# Patient Record
Sex: FEMALE | Race: WHITE | NOT HISPANIC OR LATINO | URBAN - METROPOLITAN AREA
[De-identification: names, ages, dates, MRNs, and addresses within clinical notes are randomized per-mention and may not be internally consistent; named-entity substitution may affect disease eponyms.]

---

## 2017-02-06 ENCOUNTER — EMERGENCY (EMERGENCY)
Facility: HOSPITAL | Age: 82
LOS: 1 days | End: 2017-02-06
Payer: MEDICARE

## 2017-02-06 PROCEDURE — 70450 CT HEAD/BRAIN W/O DYE: CPT | Mod: 26

## 2017-02-06 PROCEDURE — 99284 EMERGENCY DEPT VISIT MOD MDM: CPT

## 2017-10-29 PROBLEM — Z00.00 ENCOUNTER FOR PREVENTIVE HEALTH EXAMINATION: Status: ACTIVE | Noted: 2017-10-29

## 2017-11-28 ENCOUNTER — APPOINTMENT (OUTPATIENT)
Dept: UROGYNECOLOGY | Facility: CLINIC | Age: 82
End: 2017-11-28

## 2021-03-10 ENCOUNTER — OUTPATIENT (OUTPATIENT)
Dept: OUTPATIENT SERVICES | Facility: HOSPITAL | Age: 86
LOS: 1 days | End: 2021-03-10

## 2021-08-10 ENCOUNTER — TRANSCRIPTION ENCOUNTER (OUTPATIENT)
Age: 86
End: 2021-08-10

## 2021-08-10 ENCOUNTER — EMERGENCY (EMERGENCY)
Facility: HOSPITAL | Age: 86
LOS: 1 days | End: 2021-08-10
Admitting: EMERGENCY MEDICINE
Payer: MEDICARE

## 2021-08-10 ENCOUNTER — INPATIENT (INPATIENT)
Facility: HOSPITAL | Age: 86
LOS: 1 days | Discharge: HOME | DRG: 85 | End: 2021-08-12
Attending: HOSPITALIST | Admitting: SURGERY
Payer: MEDICARE

## 2021-08-10 VITALS
RESPIRATION RATE: 22 BRPM | HEART RATE: 77 BPM | SYSTOLIC BLOOD PRESSURE: 173 MMHG | TEMPERATURE: 98 F | DIASTOLIC BLOOD PRESSURE: 91 MMHG | OXYGEN SATURATION: 96 %

## 2021-08-10 DIAGNOSIS — Z90.49 ACQUIRED ABSENCE OF OTHER SPECIFIED PARTS OF DIGESTIVE TRACT: Chronic | ICD-10-CM

## 2021-08-10 DIAGNOSIS — Z90.5 ACQUIRED ABSENCE OF KIDNEY: Chronic | ICD-10-CM

## 2021-08-10 DIAGNOSIS — S06.5X9A TRAUMATIC SUBDURAL HEMORRHAGE WITH LOSS OF CONSCIOUSNESS OF UNSPECIFIED DURATION, INITIAL ENCOUNTER: ICD-10-CM

## 2021-08-10 LAB
ALBUMIN SERPL ELPH-MCNC: 3.8 G/DL — SIGNIFICANT CHANGE UP (ref 3.3–5.2)
ALP SERPL-CCNC: 56 U/L — SIGNIFICANT CHANGE UP (ref 40–120)
ALT FLD-CCNC: 8 U/L — SIGNIFICANT CHANGE UP
ANION GAP SERPL CALC-SCNC: 12 MMOL/L — SIGNIFICANT CHANGE UP (ref 5–17)
APPEARANCE UR: CLEAR — SIGNIFICANT CHANGE UP
APTT BLD: 32.2 SEC — SIGNIFICANT CHANGE UP (ref 27.5–35.5)
AST SERPL-CCNC: 16 U/L — SIGNIFICANT CHANGE UP
BACTERIA # UR AUTO: ABNORMAL
BASOPHILS # BLD AUTO: 0.03 K/UL — SIGNIFICANT CHANGE UP (ref 0–0.2)
BASOPHILS NFR BLD AUTO: 0.3 % — SIGNIFICANT CHANGE UP (ref 0–2)
BILIRUB SERPL-MCNC: 0.3 MG/DL — LOW (ref 0.4–2)
BILIRUB UR-MCNC: NEGATIVE — SIGNIFICANT CHANGE UP
BUN SERPL-MCNC: 12.9 MG/DL — SIGNIFICANT CHANGE UP (ref 8–20)
CALCIUM SERPL-MCNC: 9.1 MG/DL — SIGNIFICANT CHANGE UP (ref 8.6–10.2)
CHLORIDE SERPL-SCNC: 107 MMOL/L — SIGNIFICANT CHANGE UP (ref 98–107)
CO2 SERPL-SCNC: 22 MMOL/L — SIGNIFICANT CHANGE UP (ref 22–29)
COLOR SPEC: YELLOW — SIGNIFICANT CHANGE UP
CREAT SERPL-MCNC: 0.93 MG/DL — SIGNIFICANT CHANGE UP (ref 0.5–1.3)
DIFF PNL FLD: ABNORMAL
EOSINOPHIL # BLD AUTO: 0.04 K/UL — SIGNIFICANT CHANGE UP (ref 0–0.5)
EOSINOPHIL NFR BLD AUTO: 0.4 % — SIGNIFICANT CHANGE UP (ref 0–6)
EPI CELLS # UR: SIGNIFICANT CHANGE UP
GLUCOSE SERPL-MCNC: 135 MG/DL — HIGH (ref 70–99)
GLUCOSE UR QL: NEGATIVE MG/DL — SIGNIFICANT CHANGE UP
HCT VFR BLD CALC: 41.7 % — SIGNIFICANT CHANGE UP (ref 34.5–45)
HGB BLD-MCNC: 13 G/DL — SIGNIFICANT CHANGE UP (ref 11.5–15.5)
IMM GRANULOCYTES NFR BLD AUTO: 0.2 % — SIGNIFICANT CHANGE UP (ref 0–1.5)
KETONES UR-MCNC: NEGATIVE — SIGNIFICANT CHANGE UP
LEUKOCYTE ESTERASE UR-ACNC: NEGATIVE — SIGNIFICANT CHANGE UP
LYMPHOCYTES # BLD AUTO: 1.1 K/UL — SIGNIFICANT CHANGE UP (ref 1–3.3)
LYMPHOCYTES # BLD AUTO: 11.2 % — LOW (ref 13–44)
MCHC RBC-ENTMCNC: 28.9 PG — SIGNIFICANT CHANGE UP (ref 27–34)
MCHC RBC-ENTMCNC: 31.2 GM/DL — LOW (ref 32–36)
MCV RBC AUTO: 92.7 FL — SIGNIFICANT CHANGE UP (ref 80–100)
MONOCYTES # BLD AUTO: 0.46 K/UL — SIGNIFICANT CHANGE UP (ref 0–0.9)
MONOCYTES NFR BLD AUTO: 4.7 % — SIGNIFICANT CHANGE UP (ref 2–14)
NEUTROPHILS # BLD AUTO: 8.21 K/UL — HIGH (ref 1.8–7.4)
NEUTROPHILS NFR BLD AUTO: 83.2 % — HIGH (ref 43–77)
NITRITE UR-MCNC: NEGATIVE — SIGNIFICANT CHANGE UP
PH UR: 7 — SIGNIFICANT CHANGE UP (ref 5–8)
PLATELET # BLD AUTO: 194 K/UL — SIGNIFICANT CHANGE UP (ref 150–400)
POTASSIUM SERPL-MCNC: 4.2 MMOL/L — SIGNIFICANT CHANGE UP (ref 3.5–5.3)
POTASSIUM SERPL-SCNC: 4.2 MMOL/L — SIGNIFICANT CHANGE UP (ref 3.5–5.3)
PROT SERPL-MCNC: 6.6 G/DL — SIGNIFICANT CHANGE UP (ref 6.6–8.7)
PROT UR-MCNC: NEGATIVE MG/DL — SIGNIFICANT CHANGE UP
RBC # BLD: 4.5 M/UL — SIGNIFICANT CHANGE UP (ref 3.8–5.2)
RBC # FLD: 13.6 % — SIGNIFICANT CHANGE UP (ref 10.3–14.5)
RBC CASTS # UR COMP ASSIST: SIGNIFICANT CHANGE UP /HPF (ref 0–4)
SODIUM SERPL-SCNC: 141 MMOL/L — SIGNIFICANT CHANGE UP (ref 135–145)
SP GR SPEC: 1 — LOW (ref 1.01–1.02)
UROBILINOGEN FLD QL: NEGATIVE MG/DL — SIGNIFICANT CHANGE UP
WBC # BLD: 9.86 K/UL — SIGNIFICANT CHANGE UP (ref 3.8–10.5)
WBC # FLD AUTO: 9.86 K/UL — SIGNIFICANT CHANGE UP (ref 3.8–10.5)
WBC UR QL: SIGNIFICANT CHANGE UP

## 2021-08-10 PROCEDURE — 72170 X-RAY EXAM OF PELVIS: CPT | Mod: 26

## 2021-08-10 PROCEDURE — 99291 CRITICAL CARE FIRST HOUR: CPT | Mod: GC,25

## 2021-08-10 PROCEDURE — 74176 CT ABD & PELVIS W/O CONTRAST: CPT | Mod: 26

## 2021-08-10 PROCEDURE — 99291 CRITICAL CARE FIRST HOUR: CPT

## 2021-08-10 PROCEDURE — 73110 X-RAY EXAM OF WRIST: CPT | Mod: 26,RT

## 2021-08-10 PROCEDURE — 70486 CT MAXILLOFACIAL W/O DYE: CPT | Mod: 26

## 2021-08-10 PROCEDURE — 93306 TTE W/DOPPLER COMPLETE: CPT | Mod: 26

## 2021-08-10 PROCEDURE — 99285 EMERGENCY DEPT VISIT HI MDM: CPT | Mod: 25

## 2021-08-10 PROCEDURE — 29125 APPL SHORT ARM SPLINT STATIC: CPT

## 2021-08-10 PROCEDURE — 71045 X-RAY EXAM CHEST 1 VIEW: CPT | Mod: 26

## 2021-08-10 PROCEDURE — 72125 CT NECK SPINE W/O DYE: CPT | Mod: 26

## 2021-08-10 PROCEDURE — 70450 CT HEAD/BRAIN W/O DYE: CPT | Mod: 26

## 2021-08-10 PROCEDURE — 73564 X-RAY EXAM KNEE 4 OR MORE: CPT | Mod: 26,RT

## 2021-08-10 PROCEDURE — 99222 1ST HOSP IP/OBS MODERATE 55: CPT

## 2021-08-10 PROCEDURE — 93010 ELECTROCARDIOGRAM REPORT: CPT

## 2021-08-10 PROCEDURE — 71250 CT THORAX DX C-: CPT | Mod: 26

## 2021-08-10 PROCEDURE — 73130 X-RAY EXAM OF HAND: CPT | Mod: 26,RT

## 2021-08-10 RX ORDER — ONDANSETRON 8 MG/1
4 TABLET, FILM COATED ORAL EVERY 6 HOURS
Refills: 0 | Status: DISCONTINUED | OUTPATIENT
Start: 2021-08-10 | End: 2021-08-12

## 2021-08-10 RX ORDER — AMLODIPINE BESYLATE 2.5 MG/1
5 TABLET ORAL DAILY
Refills: 0 | Status: DISCONTINUED | OUTPATIENT
Start: 2021-08-10 | End: 2021-08-12

## 2021-08-10 RX ORDER — PANTOPRAZOLE SODIUM 20 MG/1
40 TABLET, DELAYED RELEASE ORAL DAILY
Refills: 0 | Status: DISCONTINUED | OUTPATIENT
Start: 2021-08-10 | End: 2021-08-12

## 2021-08-10 RX ORDER — LEVETIRACETAM 250 MG/1
500 TABLET, FILM COATED ORAL EVERY 12 HOURS
Refills: 0 | Status: DISCONTINUED | OUTPATIENT
Start: 2021-08-10 | End: 2021-08-12

## 2021-08-10 RX ORDER — SODIUM CHLORIDE 9 MG/ML
1000 INJECTION INTRAMUSCULAR; INTRAVENOUS; SUBCUTANEOUS
Refills: 0 | Status: DISCONTINUED | OUTPATIENT
Start: 2021-08-10 | End: 2021-08-10

## 2021-08-10 RX ORDER — NITROFURANTOIN MACROCRYSTAL 50 MG
100 CAPSULE ORAL
Refills: 0 | Status: DISCONTINUED | OUTPATIENT
Start: 2021-08-10 | End: 2021-08-11

## 2021-08-10 RX ORDER — HYDRALAZINE HCL 50 MG
10 TABLET ORAL
Refills: 0 | Status: DISCONTINUED | OUTPATIENT
Start: 2021-08-10 | End: 2021-08-12

## 2021-08-10 RX ORDER — ACETAMINOPHEN 500 MG
650 TABLET ORAL EVERY 6 HOURS
Refills: 0 | Status: DISCONTINUED | OUTPATIENT
Start: 2021-08-10 | End: 2021-08-12

## 2021-08-10 RX ORDER — LABETALOL HCL 100 MG
10 TABLET ORAL
Refills: 0 | Status: DISCONTINUED | OUTPATIENT
Start: 2021-08-10 | End: 2021-08-11

## 2021-08-10 RX ADMIN — Medication 10 MILLIGRAM(S): at 15:28

## 2021-08-10 RX ADMIN — Medication 650 MILLIGRAM(S): at 17:50

## 2021-08-10 RX ADMIN — LEVETIRACETAM 500 MILLIGRAM(S): 250 TABLET, FILM COATED ORAL at 17:29

## 2021-08-10 RX ADMIN — Medication 10 MILLIGRAM(S): at 17:29

## 2021-08-10 RX ADMIN — Medication 100 MILLIGRAM(S): at 18:16

## 2021-08-10 RX ADMIN — SODIUM CHLORIDE 50 MILLILITER(S): 9 INJECTION INTRAMUSCULAR; INTRAVENOUS; SUBCUTANEOUS at 17:31

## 2021-08-10 NOTE — ED PROVIDER NOTE - CLINICAL SUMMARY MEDICAL DECISION MAKING FREE TEXT BOX
Pt is an 87 y.o. F hx of HTN presenting after fall today with subdural hematoma. Head of bed elevated, trauma activation, neurosurgery contacted, labs. NSICU admission. Pt is an 87 y.o. F hx of HTN presenting after fall today with left subdural hematoma. Head of bed elevated, trauma activation, neurosurgery contacted, labs. NSICU admission. Laceration repaired at bedside by trauma surgery.

## 2021-08-10 NOTE — H&P ADULT - NSHPPHYSICALEXAM_GEN_ALL_CORE
Constitutional: Well-developed well nourished female in no acute distress  HEENT: Head is normocephalic with Left frontal skull lac. with small hematoma, maxillofacial structures stable, no blood or discharge from nares or oral cavity, no ding sign / raccoon eyes, EOMI b/l, pupils 4 mm round and reactive to light b/l, no active drainage or redness  Neck: trachea midline, supple  Respiratory: Breath sounds CTA b/l respirations are unlabored, no accessory muscle use, no conversational dyspnea  Cardiovascular: Regular rate & rhythm, +S1, S2  Chest: Left chest wall TTP, no obvious bruising, no subQ emphysema or crepitus palpated  Gastrointestinal: Abdomen soft, non-tender, non-distended, no rebound tenderness / guarding, no ecchymosis or external signs of abdominal trauma  Extremities: moving all extremities spontaneously, Right arm splinted with thumb ecchymosis.  2+ distal pulses.  Abrasions to b/l knees, abrasion to lateral aspect of Left leg  Pelvis: stable  Vascular: 2+ radial, femoral, and DP pulses b/l  Neurological: GCS: 15 (4/5/6). A&O x 3; no gross sensory / motor / coordination deficits  Musculoskeletal: 5/5 strength of upper and lower extremities b/l  Back: no C/T/LS spine tenderness to palpation, no step-offs or signs of external trauma to the back

## 2021-08-10 NOTE — ED PROVIDER NOTE - PHYSICAL EXAMINATION
General: NAD  Head:  NC, +1cm laceration on left forehead, mild ooze, dressing in place  Eyes: EOMI, PERRLA, no scleral icterus  Ears: no erythema/drainage  Nose: midline, no bleeding/drainage  Throat: MMM  Cardiac: RRR, no m/r/g, no lower extremity edema  Respiratory: CTABL, no wheezes/rales/rhonchi, equal chest wall expansions, no use of accessory muscles, no retractions  Abdomen: soft, nondistended, nontender, no rebound tenderness, no guarding, nonperitonitic  MSK/Vascular: full ROM, soft compartments, warm extremities  Neuro: Alert and oriented, motor/sensory intact  Psych: calm, cooperative, normal affect

## 2021-08-10 NOTE — CONSULT NOTE ADULT - ATTENDING COMMENTS
86 yo F with mild TBI - GCS 15, small L SDH, trace tSAH. No focal neurosigns.  S/p fall due to dizziness; denies any CP, SOB or palpitations; noted dizziness for the last week. No signs or Sx of infection. Normal PO intake.  EKG with non-specific inferior-lateral TWI. No hypotension on arrival.  Broad differential at this time.   VS, labs, imaging reviewed.    Plan:  admit to NCCU for close observation, possible need for surgical intervention if deteriorates  monitor telemetry  NSGy involved  syncope w/up incl. TTE, carotid Doppler  stability CTh  maintain -150  rest as above    Pt is critically ill and at high risk of deterioration/death due to abovementioned conditions.   Requires critical care interventions - ongoing repeated evaluations, interventions and management adjustment by the Attending and ICU team.

## 2021-08-10 NOTE — DISCHARGE NOTE NURSING/CASE MANAGEMENT/SOCIAL WORK - PATIENT PORTAL LINK FT
You can access the FollowMyHealth Patient Portal offered by [facility] by registering at the following website: [email or web address]. By joining [employer]’s FollowMyHealth portal, you will also be able to view your health information using other applications (apps) compatible with our system.

## 2021-08-10 NOTE — H&P ADULT - ASSESSMENT
88 y/o woman with Left frontal/temporal SDH, small frontal skull lac (repaired in trauma bay with 2-4.0 Ethilon, multiple abrasions, VSS.    1) transfer to Neurosurgical ICU  2) repeat CT scan ~ 5Pm tonight  3) neurochecks per neurosurg.  4) routine labs  5) cleared from Trauma surgery perspective

## 2021-08-10 NOTE — ED ADULT TRIAGE NOTE - CHIEF COMPLAINT QUOTE
Transfer from [facility] for Sub[facility], fall today. A&Ox4 upon arrival, laceration to forehead, abrasions to knees and shoulders with fracture to right thumb. medicated with keppra, tylenol, and tetanus

## 2021-08-10 NOTE — ED PROVIDER NOTE - ATTENDING CONTRIBUTION TO CARE
Documentation, interpretation of results, consultation with other physicians.    88 y/o F [place] [facility] [place] sustaining + head trauma, left forehead lac, right thumb fracture, and subdural hematoma. No blood thinners.    AP - NAD, 1 cm laceration to left forehead, minimal bleeding, RRR, lungs CTA B/L, abd soft, NT/ND, chest wall stable, pelvis stable, abrasions to bilateral knees, left lower leg, GCS 15.    No airway intervention required at this time, work up per trauma team and neurosurgery.

## 2021-08-10 NOTE — CONSULT NOTE ADULT - ASSESSMENT
Assessment:   88 yo F with PMH of HTN who presents today after dizziness causing fall down 4 stairs with hit to head, no LOC. Patient found to have L SDH measuring 6.7 mm with 2 mm midline shift. Patient's exam intact, no deficits noted.       Neuro:  - Q1 hour Neuro checks, Q1 hour Vitals  - HOB 30 degrees, Neck midline position  - Maintain normothermia, PO acetaminophen for temp>38 C or pain  - Neurosurgical imaging reviewed  - Repeat CTH at 1700 to establish stability of bleed  - Keppra 500 BID  - Monitor forehead lac   - Pain management & Sedation: tylenol PRN  - Turn and Position Q2  /  Activity ad aayush, with assistance  	  CV:  - SBP Goal 100-140  - BP regimen: hydralazine / labetalol PRN   - TTE   - Lipid panel in am     Pulm:  - Supplemental O2 PRN to maintain Spo2>92%  - Chest PT, OOB, Pulmonary Toilet    GI:  - NPO pending dysphagia   - Bowel regimen when PO access established  - Zofran PRN   	  Gu:  - Voiding  - I&O Q1 hour  - Monitor Electrolytes & Renal Function  - NS @ 50     Heme:  - Monitor H&H  - Chemical DVT prophylaxis: * Chemical DVT prophylaxis is contraindicated due to risk of bleeding  - Mechanical DVT Prophylaxis: Maintain B/L LE sequential compression devices  	  ID:  - Monitor WBC and Temperature    Endo  - Monitor BGL, maintain <180  - A1C in am   - TSH in am     Extremities:   - Ortho consulted for R thumb fx, recommended thumb spica cast   - F/U Dr. Hassan at discharge

## 2021-08-10 NOTE — CONSULT NOTE ADULT - ASSESSMENT
86 y/o F PMH HTN transferred [place] [facility] [place] & fall this morning. CTH at [facility] reveals Left frontotemporal 6.7 mm extra-axial hemorrhagic collection likely subdural with exophytic temporal component small epidural not excluded, and 5 mm left posterior frontoparietal possible tiny intraparenchymal hemorrhagic contusion and subarachnoid hemorrhage, 2 mm rightward shift.     Plan:  -D/w Dr. Almendarez  -Imaging reviewed  -Q1h neuro checks  -Repeat CTH in 6 hours (5:00 PM)  -SCDs for DVT PPX; chemical DVT PPX contraindicated d/t increased bleeding risk  -Keppra 500 mg BID x 7 days for seizure PPX   -Pain control PRN; avoid oversedation  -Cleared by trauma   -Admit to NSICU for further medical management / observation

## 2021-08-10 NOTE — ED PROVIDER NOTE - OBJECTIVE STATEMENT
Pt is an 87 y.o. F hx of HTN presenting after fall today. Pt transferred from [facility] [place] [place] after being noted to have subdural hematoma. The pt fell down four stairs, +head trauma with laceration to left forehead, abrasions to knees and shoulders, denies LOC. No other complaints. Old fractured right thumb which is splinted. Pt is an 87 y.o. F hx of HTN presenting after fall today. Pt [facility] after being noted to have left subdural hematoma. The pt fell down four stairs, +head trauma with laceration to left forehead, abrasions to knees and shoulders, denies LOC. No other complaints. Old fractured right thumb which is splinted.

## 2021-08-10 NOTE — H&P ADULT - HISTORY OF PRESENT ILLNESS
86 y/o woman transfer from [facility] [place] after fall down four concrete steps.  Imaging at [facility] revealed Left frontal/temporal SDH.  No LOC.  C-spine cleared by [facility].  Pt. not on any anticoagulation.  No gross long bone fractures noted.  Multiple abrasions including one left frontal skull laceration with small hematoma.

## 2021-08-10 NOTE — CHART NOTE - NSCHARTNOTEFT_GEN_A_CORE
Tertiary Trauma Survey (TTS)    Date of TTS: 08-10-21 @ 14:38                             Admit Date: 08-10-21 @ 13:40      Trauma Activation:      Subjective / 24 hour events: No acute events since initial injury.      Vital Signs Last 24 Hrs  T(C): 36.8 (10 Aug 2021 13:47), Max: 36.8 (10 Aug 2021 13:47)  T(F): 98.2 (10 Aug 2021 13:47), Max: 98.2 (10 Aug 2021 13:47)  HR: 77 (10 Aug 2021 13:47) (77 - 77)  BP: 173/91 (10 Aug 2021 13:47) (173/91 - 173/91)  BP(mean): --  RR: 22 (10 Aug 2021 13:47) (22 - 22)  SpO2: 96% (10 Aug 2021 13:47) (96% - 96%)    Physical Exam:    Neuro: [x ] non focal neurological exam [ ] Focal Neurological deficits noted to be:     HEENT: [ ] Normo-cephalic/atraumatic  [x ] abnormalities noted to be:  2cm Laceration over L frontal area, closed with 4-0 Ethilon     Pulm/Chest:  [ x] CTA b/l  [ ] chest wall non tender  [ ] abnormalities noted to be:    Cardiac: [x ] S1S2, sinus rhythm  [ ] abnormalities noted to be:     GI / Abdomen: [x ] Soft, non-tender, non-distended [ ] abnormalities noted to be:    Musculoskeletal / Extremities: [ x]normal active ROM  [ ]  abnormalities noted to be:    Integumentary: [x ] Skin intact [ ] Warm [ ] Dry [ ]abnormalities noted to be:    Vascular: [x ] 2+ palpable distal pulses  [ ] DAVIE:       [ ] abnormalities noted to be:    List Injuries Identified to Date:  L frontal SDH  L Frontal skin laceration     List Operative and Interventional Radiological Procedures:     Consults (Date):  [  ] Neurosurgery   [  ] Orthopedics  [  ] Plastics  [  ] Urology  [  ] PM&R  [  ] Social Work    RADIOLOGICAL FINDINGS REVIEW:  CXR:     Pelvis Films:     C-Spine Films:    T/L/S Spine Films:    Extremity Films:    Head CT:    C-Spine CT:    Neck CT:    Chest CT:    ABD/Pelvis CT:    Other: Tertiary Trauma Survey (TTS)    Date of TTS: 08-10-21 @ 14:38                             Admit Date: 08-10-21 @ 13:40      Trauma Activation:      Subjective / 24 hour events: No acute events since initial injury.      Vital Signs Last 24 Hrs  T(C): 36.8 (10 Aug 2021 13:47), Max: 36.8 (10 Aug 2021 13:47)  T(F): 98.2 (10 Aug 2021 13:47), Max: 98.2 (10 Aug 2021 13:47)  HR: 77 (10 Aug 2021 13:47) (77 - 77)  BP: 173/91 (10 Aug 2021 13:47) (173/91 - 173/91)  BP(mean): --  RR: 22 (10 Aug 2021 13:47) (22 - 22)  SpO2: 96% (10 Aug 2021 13:47) (96% - 96%)    Physical Exam:    Neuro: [x ] non focal neurological exam [ ] Focal Neurological deficits noted to be:     HEENT: [ ] Normo-cephalic/atraumatic  [x ] abnormalities noted to be:  2cm Laceration over L frontal area, closed with 4-0 Ethilon     Pulm/Chest:  [ x] CTA b/l  [ ] chest wall non tender  [ ] abnormalities noted to be:    Cardiac: [x ] S1S2, sinus rhythm  [ ] abnormalities noted to be:     GI / Abdomen: [x ] Soft, non-tender, non-distended [ ] abnormalities noted to be:    Musculoskeletal / Extremities: [ x]normal active ROM  [ ]  abnormalities noted to be:    Integumentary: [x ] Skin intact [ ] Warm [ ] Dry [ ]abnormalities noted to be:    Vascular: [x ] 2+ palpable distal pulses  [ ] DAVIE:       [ ] abnormalities noted to be:    List Injuries Identified to Date:  L frontal SDH  L Frontal skin laceration     List Operative and Interventional Radiological Procedures:     Consults (Date):  [  ] Neurosurgery   [  ] Orthopedics  [  ] Plastics  [  ] Urology  [  ] PM&R  [  ] Social Work    RADIOLOGICAL FINDINGS REVIEW:  CXR:  Heart enlargement again noted. No acute finding or change    Pelvis Films: No acute findings    C-Spine Films:    T/L/S Spine Films:    Extremity Films: Bilateral Knee XR - No acute findings   R Hand: There is a longitudinal fracture of the distal phalanx of the thumb in fairly good alignment and possible local comminution.    Head CT:  C-Spine CT:  Neck CT:  Left frontotemporal 6.7 mm extra-axial hemorrhagic collection likely subdural with exophytic temporal component small epidural not excluded, and 5 mm left posterior frontoparietal possible tiny intraparenchymal hemorrhagic contusion and subarachnoid hemorrhage, 2 mm rightward shift, if symptoms persist consider follow-up head CT or MRI if no contraindications.  Volume loss, microvascular disease, age-indeterminate lacunar infarcts, basal cisterns are patent. Absent lenses with cataract surgery, Left frontal scalp soft tissue swelling without underlying fracture.  Multilevel degenerative cervical spine, 3 mm anterolisthesis C7 on T1, degenerative change C6-7, no prevertebral soft tissue swelling, no obvious acute fracture, or dislocation.    Chest CT:   No acute traumatic injury.  2.  3-4mm sized posterior RUL subpleural/fissural (3/151, 182) densities. Correlation with comorbidities may determine need for follow-up CT in one year  3.  Multilevel thoracolumbar deformities favor chronic etiology    ABD/Pelvis CT:   No acute traumatic injury.  2.  3-4mm sized posterior RUL subpleural/fissural (3/151, 182) densities. Correlation with comorbidities may determine need for follow-up CT in one year  3.  Multilevel thoracolumbar deformities favor chronic etiology    Other:

## 2021-08-10 NOTE — H&P ADULT - ATTENDING COMMENTS
Trauma team activation.  I have seen and examined the patient on arrival.  GLF with ICH, transferred for OSH    ABCD Intact: GCS 15, LEROY, non focal  right thumb ecchymosis splinted. good cap refill.  I have reviewed images form OSH, no life threatening injuries.  A/P   tSDH   Admit to Neuro ICU  NO further trauma work up  required  OK to admit to Neuro ICU  Thumb fx, splinted, can follow as outpatiient.  repeat CT in 4-6 hrs

## 2021-08-10 NOTE — H&P ADULT - REASON FOR ADMISSION
Trauma, fall.  Imaging at [facility] [place] revealed Left frontal/temporal SDH.  No LOC

## 2021-08-10 NOTE — CONSULT NOTE ADULT - SUBJECTIVE AND OBJECTIVE BOX
Patient is a 87y old  Female who presents with a chief complaint of   HPI:  86 yo F with PMH of HTN who presents with traumatic SDH. Patient reports that she was feeling well this morning. Reports that she was walking outside through her back door at which time she felt dizzy, fell down a few steps, and hit her head on the concrete. Reports that she has been feeling intermittently dizzy over the past week, does not feel it is associated with standing from sitting, change in position, etc. Patient denies chest pain, palpitations, SOB associated with dizziness/fall. Patient was taken to [facility] [place], found to have L SDH with minimal shift. Tx to [facility] for further workup and treatment. Denies hx of fevers, weight loss, nausea, vomiting, AC/AP use. Currently has no complaints, including denies headache.       PAST MEDICAL & SURGICAL HISTORY:  HTN (hypertension)  S/P cholecystectomy  S/p nephrectomy    FAMILY HISTORY:  No pertinent family history in first degree relatives    SOCIAL HISTORY:  Tobacco Use: smoked for 15 years, quit over 40 years ago  EtOH use: rarely 1 glass of wine  Substance: denies     Allergies  codeine (Other)  penicillin (Hives)      REVIEW OF SYSTEMS  Negative except as noted in HPI  CONSTITUTIONAL: No fever, weight loss, or fatigue  EYES: No eye pain, visual disturbances, or discharge  ENMT:  No difficulty hearing, tinnitus, vertigo; No sinus or throat pain  NECK: No pain or stiffness  BREASTS: No pain, masses, or nipple discharge  RESPIRATORY: No cough, wheezing, chills or hemoptysis; No shortness of breath  CARDIOVASCULAR: No chest pain, palpitations, dizziness, or leg swelling  GASTROINTESTINAL: No abdominal or epigastric pain. No nausea, vomiting, or hematemesis; No diarrhea or constipation. No melena or hematochezia.  GENITOURINARY: No dysuria, frequency, hematuria, or incontinence  NEUROLOGICAL: No headaches, memory loss, loss of strength, numbness, or tremors  SKIN: No itching, burning, rashes, or lesions   LYMPH NODES: No enlarged glands  ENDOCRINE: No heat or cold intolerance; No hair loss  MUSCULOSKELETAL: Admits to thumb pain. No muscle or back pain  PSYCHIATRIC: No depression, anxiety, mood swings, or difficulty sleeping  HEME/LYMPH: No easy bruising, or bleeding gums  ALLERY AND IMMUNOLOGIC: No hives or eczema      Vital Signs Last 24 Hrs  T(C): 36.8 (10 Aug 2021 13:47), Max: 36.8 (10 Aug 2021 13:47)  T(F): 98.2 (10 Aug 2021 13:47), Max: 98.2 (10 Aug 2021 13:47)  HR: 77 (10 Aug 2021 13:47) (77 - 77)  BP: 173/91 (10 Aug 2021 13:47) (173/91 - 173/91)  RR: 22 (10 Aug 2021 13:47) (22 - 22)  SpO2: 96% (10 Aug 2021 13:47) (96% - 96%)      Physical Exam:  Constitutional: NAD, lying in ED stretcher  Neuro  * Mental Status:  GCS 15: Awake, alert, oriented to conversation. No aphasia or difficulty speaking. No dysarthria. Able to name objects and their function.  * Cranial Nerves: Cnii-Cnxii grossly intact. PERRL, EOMI, tongue midline, no gaze deviation  * Motor: RUE 5/5, LUE 5/5, RLE 5/5, LLE 5/5, no drift or dysmetria  * Sensory: Sensation intact to light touch  * Reflexes: not assessed   Cardiovascular:  S1, S2 no murmurs appreciated.  Regular rate and rhythm.  Eyes: See neurologic examination with detailed examination of eyes.  ENT: No JVD, Trachea Midline  Respiratory: Clear to auscultation.  Gastrointestinal: Soft, nontender, nondistended.  Genitourinary: [ ] Narvaez, [ x ] No Narvaez.   Musculoskeletal: No muscle wasting noted, (See neurologic assessment for full muscle strength assessment) No pretibial edema appreciated, no appreciable calf tenderness.  Skin: R thumb ecchymotic and swollen with known distal fracture. Bilateral knee abrasions. L forehead laceration with 2 stitches in place.  Musculoskeletal: See detailed muscle strength examination, listed under neurologic examination.  Hematologic / Lymph / Immunologic: No bleeding from IV sites or wounds, No lymphadenopathy, No Hives or allergic type skin lesions      LABS:                        13.0   9.86  )-----------( 194      ( 10 Aug 2021 14:51 )             41.7       RADIOLOGY & ADDITIONAL STUDIES:  [facility] 8/10/21:   IMPRESSION:    Left frontotemporal 6.7 mm extra-axial hemorrhagic collection likely subdural with exophytic temporal component small epidural not excluded, and 5 mm left posterior frontoparietal possible tiny intraparenchymal hemorrhagic contusion and subarachnoid hemorrhage, 2 mm rightward shift, if symptoms persist consider follow-up head CT or MRI if no contraindications.    Volume loss, microvascular disease, age-indeterminate lacunar infarcts, basal cisterns are patent. Absent lenses with cataract surgery, Left frontal scalp soft tissue swelling without underlying fracture.    Multilevel degenerative cervical spine, 3 mm anterolisthesis C7 on T1, degenerative change C6-7, no prevertebral soft tissue swelling, no obvious acute fracture, or dislocation.    Findings discussed with MAGAN Abebe, [facility] ED at immediate time of review, 8/10/2021 at 11:30 AM.    --- End of Report ---    ESTELA EDWARDS MD; Attending Radiologist  This document has been electronically signed. Aug 10 2021 12:27PM

## 2021-08-10 NOTE — H&P ADULT - NSHPLABSRESULTS_GEN_ALL_CORE
Color consistent with ethnicity/race, warm, dry intact, resilient.
13.0   9.86  )-----------( 194      ( 10 Aug 2021 14:51 )             41.7   08-10    141  |  107  |  12.9  ----------------------------<  135<H>  4.2   |  22.0  |  0.93    Ca    9.1      10 Aug 2021 14:51    TPro  6.6  /  Alb  3.8  /  TBili  0.3<L>  /  DBili  x   /  AST  16  /  ALT  8   /  AlkPhos  56  08-10    PTT - ( 10 Aug 2021 14:51 )  PTT:32.2 sec

## 2021-08-10 NOTE — ED ADULT NURSE NOTE - CHIEF COMPLAINT QUOTE
Transfer from [facility] [place] for Subdural collection, fall today. A&Ox4 upon arrival, laceration to forehead, abrasions to knees and shoulders with fracture to right thumb. medicated with keppra, tylenol, and tetanus

## 2021-08-10 NOTE — ED PROVIDER NOTE - NS ED ROS FT
Constitutional: no fever, no sweats, and no chills.  Eyes: no pain, no redness, and no visual changes.  ENMT: no ear pain and no hearing problems, no nasal congestion/drainage, no dysphagia, and no throat pain, no neck pain, no stiffness  CV: no chest pain, no edema.  Resp: no cough, no dyspnea  GI: no abdominal pain, no bloating, no constipation, no diarrhea, no nausea and no vomiting.  : no dysuria, no hematuria  MSK: no weakness, left forehead laceration, abrasions on elbows and knees  Skin: no lesions, and no rashes.  Neuro: no LOC, no sensory deficits, and no weakness.

## 2021-08-10 NOTE — CONSULT NOTE ADULT - SUBJECTIVE AND OBJECTIVE BOX
Patient is a 87y old  Female who presents with a chief complaint of trip & fall.     HPI: Patient is an 86 y/o F PMH HTN [place] [facility] [place] this [place]. Pt. states she felt dizzy this morning and then fell from standing. Denies LOC. Pt. states she called her daughter after falling who called EMS. [place] [facility] [place] Left frontotemporal 6.7 mm extra-axial hemorrhagic collection likely subdural with exophytic temporal component small epidural not excluded, and 5 mm left posterior frontoparietal possible tiny intraparenchymal hemorrhagic contusion and subarachnoid hemorrhage, 2 mm rightward shift. Upon arrival to [facility], pt. admits to mild headache and right thumb pain. Denies use of blood thinners, weakness, numbness/tingling, visual disturbance, back pain, nausea/vomiting.       PAST MEDICAL & SURGICAL HISTORY:  HTN (hypertension)    S/P cholecystectomy    S/p nephrectomy      FAMILY HISTORY:  No pertinent family history in first degree relatives.        SOCIAL HISTORY:  Tobacco Use: Denies.  EtOH use: Denies.  Substance: Denies.    Allergies:  codeine (Other)  penicillin (Hives)    Vital Signs Last 24 Hrs  T(C): 36.8 (10 Aug 2021 13:47), Max: 36.8 (10 Aug 2021 13:47)  T(F): 98.2 (10 Aug 2021 13:47), Max: 98.2 (10 Aug 2021 13:47)  HR: 77 (10 Aug 2021 13:47) (77 - 77)  BP: 173/91 (10 Aug 2021 13:47) (173/91 - 173/91)  BP(mean): --  RR: 22 (10 Aug 2021 13:47) (22 - 22)  SpO2: 96% (10 Aug 2021 13:47) (96% - 96%)      PHYSICAL EXAM:  GENERAL: NAD, well-groomed, well-developed  HEAD:  +L forehead laceration, normocephalic  EYES: Conjunctiva and sclera clear; corneal reflex intact  ENMT: No tonsillar erythema, exudates, or enlargement; moist mucous membranes, good dentition, no lesions  NECK: Supple, no JVD, normal thyroid  DARREN COMA SCORE: E-4 V-5 M-6 = 15  MENTAL STATUS: AAO x3; Awake; Opens eyes spontaneously; Appropriately conversant without aphasia; following simple commands  CRANIAL NERVES: PERRL. EOMI without nystagmus. Facial sensation intact V1-3 distribution b/l. Face symmetric w/ normal eye closure and smile, tongue midline. Hearing grossly intact. Speech clear. Head turning and shoulder shrug intact.   MOTOR: strength 5/5 b/l upper and lower extremities, RUE limited 2/2 hand pain  SENSATION: grossly intact to light touch all extremities  EXTREMITIES:  2+ peripheral pulses, no clubbing, cyanosis, or edema, B/L knee abrasions, R thumb splinted 2/2 fracture  SKIN: Warm, dry; no rashes or lesions    RADIOLOGY & ADDITIONAL STUDIES:  [place]  Impression: Left frontotemporal 6.7 mm extra-axial hemorrhagic collection likely subdural with exophytic temporal component small epidural not excluded, and 5 mm left posterior frontoparietal possible tiny intraparenchymal hemorrhagic contusion and subarachnoid hemorrhage, 2 mm rightward shift, if symptoms persist consider follow-up head CT or MRI if no contraindications.    Volume loss, microvascular disease, age-indeterminate lacunar infarcts, basal cisterns are patent. Absent lenses with cataract surgery, Left frontal scalp soft tissue swelling without underlying fracture.    Multilevel degenerative cervical spine, 3 mm anterolisthesis C7 on T1, degenerative change C6-7, no prevertebral soft tissue swelling, no obvious acute fracture, or dislocation. Patient is a 87y old  Female who presents with a chief complaint of trip & fall.     HPI: Patient is an 88 y/o F PMH HTN transferred from [facility] s/p trip & fall this morning. Pt. states she felt dizzy this morning and then fell from standing down 4 stairs. Denies LOC. Pt. states she called her daughter after falling who called EMS. CTH at [facility] reveals Left frontotemporal 6.7 mm extra-axial hemorrhagic collection likely subdural with exophytic temporal component small epidural not excluded, and 5 mm left posterior frontoparietal possible tiny intraparenchymal hemorrhagic contusion and subarachnoid hemorrhage, 2 mm rightward shift. Upon arrival to [facility], pt. admits to mild headache and right thumb pain. Denies use of blood thinners, weakness, numbness/tingling, visual disturbance, back pain, nausea/vomiting.       PAST MEDICAL & SURGICAL HISTORY:  HTN (hypertension)    S/P cholecystectomy    S/p nephrectomy      FAMILY HISTORY:  No pertinent family history in first degree relatives.        SOCIAL HISTORY:  Tobacco Use: Denies.  EtOH use: Denies.  Substance: Denies.    Allergies:  codeine (Other)  penicillin (Hives)    Vital Signs Last 24 Hrs  T(C): 36.8 (10 Aug 2021 13:47), Max: 36.8 (10 Aug 2021 13:47)  T(F): 98.2 (10 Aug 2021 13:47), Max: 98.2 (10 Aug 2021 13:47)  HR: 77 (10 Aug 2021 13:47) (77 - 77)  BP: 173/91 (10 Aug 2021 13:47) (173/91 - 173/91)  BP(mean): --  RR: 22 (10 Aug 2021 13:47) (22 - 22)  SpO2: 96% (10 Aug 2021 13:47) (96% - 96%)      PHYSICAL EXAM:  GENERAL: NAD, well-groomed, well-developed  HEAD:  +L forehead laceration, normocephalic  EYES: Conjunctiva and sclera clear; corneal reflex intact  ENMT: No tonsillar erythema, exudates, or enlargement; moist mucous membranes, good dentition, no lesions  NECK: Supple, no JVD, normal thyroid  DARREN COMA SCORE: E-4 V-5 M-6 = 15  MENTAL STATUS: AAO x3; Awake; Opens eyes spontaneously; Appropriately conversant without aphasia; following simple commands  CRANIAL NERVES: PERRL. EOMI without nystagmus. Facial sensation intact V1-3 distribution b/l. Face symmetric w/ normal eye closure and smile, tongue midline. Hearing grossly intact. Speech clear. Head turning and shoulder shrug intact.   MOTOR: strength 5/5 b/l upper and lower extremities, RUE limited 2/2 hand pain  SENSATION: grossly intact to light touch all extremities  EXTREMITIES:  2+ peripheral pulses, no clubbing, cyanosis, or edema, B/L knee abrasions, R thumb splinted 2/2 fracture  SKIN: Warm, dry; no rashes or lesions    RADIOLOGY & ADDITIONAL STUDIES:  8/10/21 CTH ([facility]):  Impression: Left frontotemporal 6.7 mm extra-axial hemorrhagic collection likely subdural with exophytic temporal component small epidural not excluded, and 5 mm left posterior frontoparietal possible tiny intraparenchymal hemorrhagic contusion and subarachnoid hemorrhage, 2 mm rightward shift, if symptoms persist consider follow-up head CT or MRI if no contraindications.    Volume loss, microvascular disease, age-indeterminate lacunar infarcts, basal cisterns are patent. Absent lenses with cataract surgery, Left frontal scalp soft tissue swelling without underlying fracture.    Multilevel degenerative cervical spine, 3 mm anterolisthesis C7 on T1, degenerative change C6-7, no prevertebral soft tissue swelling, no obvious acute fracture, or dislocation.

## 2021-08-11 DIAGNOSIS — I10 ESSENTIAL (PRIMARY) HYPERTENSION: ICD-10-CM

## 2021-08-11 DIAGNOSIS — S06.5X9A TRAUMATIC SUBDURAL HEMORRHAGE WITH LOSS OF CONSCIOUSNESS OF UNSPECIFIED DURATION, INITIAL ENCOUNTER: ICD-10-CM

## 2021-08-11 DIAGNOSIS — G47.00 INSOMNIA, UNSPECIFIED: ICD-10-CM
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
## 2021-08-11 LAB
A1C WITH ESTIMATED AVERAGE GLUCOSE RESULT: 5.3 % — SIGNIFICANT CHANGE UP (ref 4–5.6)
CHOLEST SERPL-MCNC: 193 MG/DL — SIGNIFICANT CHANGE UP
COVID-19 SPIKE DOMAIN AB INTERP: POSITIVE
COVID-19 SPIKE DOMAIN ANTIBODY RESULT: 99.3 U/ML — HIGH
ESTIMATED AVERAGE GLUCOSE: 105 MG/DL — SIGNIFICANT CHANGE UP (ref 68–114)
HDLC SERPL-MCNC: 64 MG/DL — SIGNIFICANT CHANGE UP
LIPID PNL WITH DIRECT LDL SERPL: 112 MG/DL — HIGH
NON HDL CHOLESTEROL: 129 MG/DL — SIGNIFICANT CHANGE UP
SARS-COV-2 IGG+IGM SERPL QL IA: 99.3 U/ML — HIGH
SARS-COV-2 IGG+IGM SERPL QL IA: POSITIVE
TRIGL SERPL-MCNC: 86 MG/DL — SIGNIFICANT CHANGE UP
TSH SERPL-MCNC: 4.33 UIU/ML — HIGH (ref 0.27–4.2)

## 2021-08-11 PROCEDURE — 99233 SBSQ HOSP IP/OBS HIGH 50: CPT

## 2021-08-11 PROCEDURE — 99232 SBSQ HOSP IP/OBS MODERATE 35: CPT

## 2021-08-11 PROCEDURE — 70450 CT HEAD/BRAIN W/O DYE: CPT | Mod: 26

## 2021-08-11 PROCEDURE — 93880 EXTRACRANIAL BILAT STUDY: CPT | Mod: 26

## 2021-08-11 RX ORDER — RALOXIFENE HYDROCHLORIDE 60 MG/1
1 TABLET, COATED ORAL
Qty: 0 | Refills: 0 | DISCHARGE

## 2021-08-11 RX ORDER — ATORVASTATIN CALCIUM 80 MG/1
40 TABLET, FILM COATED ORAL AT BEDTIME
Refills: 0 | Status: DISCONTINUED | OUTPATIENT
Start: 2021-08-11 | End: 2021-08-12

## 2021-08-11 RX ORDER — LANOLIN ALCOHOL/MO/W.PET/CERES
1 CREAM (GRAM) TOPICAL AT BEDTIME
Refills: 0 | Status: DISCONTINUED | OUTPATIENT
Start: 2021-08-11 | End: 2021-08-12

## 2021-08-11 RX ORDER — TRAMADOL HYDROCHLORIDE 50 MG/1
25 TABLET ORAL EVERY 6 HOURS
Refills: 0 | Status: DISCONTINUED | OUTPATIENT
Start: 2021-08-11 | End: 2021-08-12

## 2021-08-11 RX ORDER — NITROFURANTOIN MACROCRYSTAL 50 MG
50 CAPSULE ORAL
Refills: 0 | Status: DISCONTINUED | OUTPATIENT
Start: 2021-08-12 | End: 2021-08-12

## 2021-08-11 RX ORDER — TRAMADOL HYDROCHLORIDE 50 MG/1
25 TABLET ORAL EVERY 6 HOURS
Refills: 0 | Status: DISCONTINUED | OUTPATIENT
Start: 2021-08-11 | End: 2021-08-11

## 2021-08-11 RX ORDER — NITROFURANTOIN MACROCRYSTAL 50 MG
100 CAPSULE ORAL
Refills: 0 | Status: DISCONTINUED | OUTPATIENT
Start: 2021-08-11 | End: 2021-08-11

## 2021-08-11 RX ORDER — SODIUM CHLORIDE 9 MG/ML
250 INJECTION INTRAMUSCULAR; INTRAVENOUS; SUBCUTANEOUS ONCE
Refills: 0 | Status: COMPLETED | OUTPATIENT
Start: 2021-08-11 | End: 2021-08-11

## 2021-08-11 RX ADMIN — LEVETIRACETAM 500 MILLIGRAM(S): 250 TABLET, FILM COATED ORAL at 05:08

## 2021-08-11 RX ADMIN — AMLODIPINE BESYLATE 5 MILLIGRAM(S): 2.5 TABLET ORAL at 05:08

## 2021-08-11 RX ADMIN — ONDANSETRON 4 MILLIGRAM(S): 8 TABLET, FILM COATED ORAL at 17:29

## 2021-08-11 RX ADMIN — Medication 650 MILLIGRAM(S): at 04:30

## 2021-08-11 RX ADMIN — TRAMADOL HYDROCHLORIDE 25 MILLIGRAM(S): 50 TABLET ORAL at 17:14

## 2021-08-11 RX ADMIN — Medication 100 MILLIGRAM(S): at 05:07

## 2021-08-11 RX ADMIN — Medication 10 MILLIGRAM(S): at 17:50

## 2021-08-11 RX ADMIN — Medication 650 MILLIGRAM(S): at 17:11

## 2021-08-11 RX ADMIN — TRAMADOL HYDROCHLORIDE 25 MILLIGRAM(S): 50 TABLET ORAL at 09:37

## 2021-08-11 RX ADMIN — Medication 650 MILLIGRAM(S): at 03:52

## 2021-08-11 RX ADMIN — Medication 650 MILLIGRAM(S): at 09:38

## 2021-08-11 RX ADMIN — Medication 650 MILLIGRAM(S): at 21:47

## 2021-08-11 RX ADMIN — LEVETIRACETAM 500 MILLIGRAM(S): 250 TABLET, FILM COATED ORAL at 17:13

## 2021-08-11 RX ADMIN — Medication 650 MILLIGRAM(S): at 15:42

## 2021-08-11 RX ADMIN — SODIUM CHLORIDE 250 MILLILITER(S): 9 INJECTION INTRAMUSCULAR; INTRAVENOUS; SUBCUTANEOUS at 11:59

## 2021-08-11 RX ADMIN — Medication 650 MILLIGRAM(S): at 22:17

## 2021-08-11 RX ADMIN — ATORVASTATIN CALCIUM 40 MILLIGRAM(S): 80 TABLET, FILM COATED ORAL at 21:47

## 2021-08-11 RX ADMIN — TRAMADOL HYDROCHLORIDE 25 MILLIGRAM(S): 50 TABLET ORAL at 11:56

## 2021-08-11 RX ADMIN — Medication 650 MILLIGRAM(S): at 11:56

## 2021-08-11 RX ADMIN — PANTOPRAZOLE SODIUM 40 MILLIGRAM(S): 20 TABLET, DELAYED RELEASE ORAL at 11:59

## 2021-08-11 RX ADMIN — Medication 1 MILLIGRAM(S): at 21:47

## 2021-08-11 NOTE — PHARMACOTHERAPY INTERVENTION NOTE - COMMENTS
Referenced pharmacy fill records to obtain medication list. Need to follow up with daughter to confirm complete medication list.
Trauma B; [facility] transfer
Called [facility] pharmacy and spoke with patient's daughter at bedside regarding medication list. Per Bernard, patient on nitrofurantoin (Macrodantin) 50mg daily
(0) understands/communicates without difficulty

## 2021-08-11 NOTE — PROGRESS NOTE ADULT - ASSESSMENT
Patient is an 88 yo F with PMH of HTN who presents from [facility] with traumatic SDH. Patient felt dizzy and fell without LOC. Patient was taken to [facility], found to have a small SDH with midline shift.

## 2021-08-11 NOTE — PROGRESS NOTE ADULT - ASSESSMENT
88 yo F with mild TBI - GCS 15, small L SDH, trace tSAH. No focal neurosigns. Clinically and radiographically stable.   S/p fall due to dizziness; orthostatics positive.  EKG with non-specific inferior-lateral TWI. No hypotension on arrival.    Plan:  neurochecks q2hrs, VS q2hrs  cont telemetry  no plan for surgical intervention; NSGy involved  syncope w/up incl. TTE, carotid Doppler - complete  maintain -150  diet as tolerated  PT/OT/OOB  limit Keppra to 5 days post trauma  250 bolus now, reassess in pm  SCDs, no chemoppx in view of fresh SDH.  Pt is stable to be transferred to SDU.  Medicine team involvement would be appreciated.

## 2021-08-11 NOTE — PHYSICAL THERAPY INITIAL EVALUATION ADULT - ADDITIONAL COMMENTS
as per pt: resides in the private house with 4 step to enter(+) rails, (-) DME, denies falls prior to admission, family available to assist and move in as needed upon D/C home

## 2021-08-11 NOTE — PHYSICAL THERAPY INITIAL EVALUATION ADULT - ACTIVE RANGE OF MOTION EXAMINATION, REHAB EVAL
assessed during functional mobility, resistance not applied , except left hand (+) right thumb spica splint/bilateral upper extremity Active ROM was WFL (within functional limits)/bilateral  lower extremity Active ROM was WFL (within functional limits)

## 2021-08-11 NOTE — CHART NOTE - NSCHARTNOTEFT_GEN_A_CORE
Ms. Tiffany Nevarez, an 86 yo F who was diagnosed with SDH, is being downgraded from the neuro ICU to the medicine service.      HPI:   86 yo F with PMH of HTN who presents with traumatic SDH. Patient reports that she was feeling well this morning. Reports that she was walking outside through her back door at which time she felt dizzy, fell down a few steps, and hit her head on the concrete. Reports that she has been feeling intermittently dizzy over the past week, does not feel it is associated with standing from sitting, change in position, etc. Patient denies chest pain, palpitations, SOB associated with dizziness/fall. Patient was taken to [facility], found to have L SDH with minimal shift. Tx to [facility] for further workup and treatment. Denies hx of fevers, weight loss, nausea, vomiting, AC/AP use.       Hospital course:   8/10/21: Transferred for SDH. Exam intact on admission, stable overnight. Repeat CTH stable. TTE performed.  8/11/21: Exam remains stable.      Physical exam:   Neuro  * Mental Status:  GCS 15:  E(4), V(5), M(6).  Awake, alert, oriented to conversation.  * Cranial Nerves: Cnii-Cnxii grossly intact. PERRL, EOMI, tongue midline, no gaze deviation  * Motor: RUE 5/5, LUE 5/5, RLE 5/5, LLE 5/5  * Sensory: Sensation intact to light touch  * Reflexes: Not assessed  * Gait: Not assessed    Cardiovascular:  S1, S2 no murmurs appreciated.  Regular rate and rhythm.  Eyes: See neurologic examination with detailed examination of eyes.  ENT: No JVD, Trachea Midline.  Respiratory: Clear to auscultation.  Gastrointestinal: Soft, nontender, nondistended.  Genitourinary: [ ] Narvaez, [ x ] No Narvaez.   Musculoskeletal: No muscle wasting noted, (See neurologic assessment for full muscle strength assessment) No pretibial edema appreciated, no appreciable calf tenderness.  Skin:  Right hand splint, right thumb swollen and echymotic  Hematologic / Lymph / Immunologic: No bleeding from IV sites or wounds, No lymphadenopathy, No Hives or allergic type skin lesions      Plan by system:   Neuro:   - Q2 hour neuro checks   - Keppra 500 BID x7 days for seizure prophylaxis  - Tylenol / tramadol PRN for pain    CV:   - SBP goal 100-140  - Amlodipine 5   - Hydralazine / labetalol 10 PRN  - , atorvastatin 40  - TTE performed  - Carotid dopplers pending    Resp:   - RA    GI:   - DASH diet  - Zofran PRN  - Protonix 40 (omeprazole home med)    :   - Voiding    Heme:  - SCDs  - Lovenox 8/12    ID:   - Nitrofurantion 100 daily (home medication 2/2 nephrectomy)     Endo:   - A1C 5.3%  - TSH 4.33, free T4 in am     Extremities:  - F/U with Dr. Hassan as OP for thumb fracture       Attestation:   Patient was stable for downgrade and accepted by medicine service Dr. Linares on 8/11/21 at 1445. If further neuro ICU needs identified, please re-call [phone].

## 2021-08-11 NOTE — PHYSICAL THERAPY INITIAL EVALUATION ADULT - IMPAIRMENTS FOUND, PT EVAL
aerobic capacity/endurance/arousal, attention, and cognition/gait, locomotion, and balance/muscle strength/neuromotor development and sensory integration

## 2021-08-11 NOTE — PHYSICAL THERAPY INITIAL EVALUATION ADULT - DISCHARGE DISPOSITION, PT EVAL
assistance as needed, rehab disposition recommendation may be change base on patient  functional progress/home w/ home PT

## 2021-08-12 ENCOUNTER — TRANSCRIPTION ENCOUNTER (OUTPATIENT)
Age: 86
End: 2021-08-12

## 2021-08-12 ENCOUNTER — EMERGENCY (EMERGENCY)
Facility: HOSPITAL | Age: 86
LOS: 1 days | Discharge: HOME | End: 2021-08-12
Attending: EMERGENCY MEDICINE
Payer: MEDICARE

## 2021-08-12 VITALS
HEART RATE: 95 BPM | OXYGEN SATURATION: 96 % | HEIGHT: 60 IN | SYSTOLIC BLOOD PRESSURE: 140 MMHG | TEMPERATURE: 98 F | RESPIRATION RATE: 18 BRPM | WEIGHT: 139.99 LBS | DIASTOLIC BLOOD PRESSURE: 82 MMHG

## 2021-08-12 VITALS
SYSTOLIC BLOOD PRESSURE: 119 MMHG | DIASTOLIC BLOOD PRESSURE: 67 MMHG | OXYGEN SATURATION: 96 % | RESPIRATION RATE: 16 BRPM | TEMPERATURE: 98 F | HEART RATE: 91 BPM

## 2021-08-12 DIAGNOSIS — Z90.49 ACQUIRED ABSENCE OF OTHER SPECIFIED PARTS OF DIGESTIVE TRACT: Chronic | ICD-10-CM

## 2021-08-12 DIAGNOSIS — Z90.5 ACQUIRED ABSENCE OF KIDNEY: Chronic | ICD-10-CM

## 2021-08-12 PROBLEM — I10 ESSENTIAL (PRIMARY) HYPERTENSION: Chronic | Status: ACTIVE | Noted: 2021-08-10

## 2021-08-12 LAB
ANION GAP SERPL CALC-SCNC: 11 MMOL/L — SIGNIFICANT CHANGE UP (ref 5–17)
BUN SERPL-MCNC: 8.4 MG/DL — SIGNIFICANT CHANGE UP (ref 8–20)
CALCIUM SERPL-MCNC: 9.3 MG/DL — SIGNIFICANT CHANGE UP (ref 8.6–10.2)
CHLORIDE SERPL-SCNC: 102 MMOL/L — SIGNIFICANT CHANGE UP (ref 98–107)
CO2 SERPL-SCNC: 23 MMOL/L — SIGNIFICANT CHANGE UP (ref 22–29)
CREAT SERPL-MCNC: 0.73 MG/DL — SIGNIFICANT CHANGE UP (ref 0.5–1.3)
GLUCOSE SERPL-MCNC: 121 MG/DL — HIGH (ref 70–99)
HCT VFR BLD CALC: 40.3 % — SIGNIFICANT CHANGE UP (ref 34.5–45)
HGB BLD-MCNC: 13.2 G/DL — SIGNIFICANT CHANGE UP (ref 11.5–15.5)
MAGNESIUM SERPL-MCNC: 2.1 MG/DL — SIGNIFICANT CHANGE UP (ref 1.6–2.6)
MCHC RBC-ENTMCNC: 29.3 PG — SIGNIFICANT CHANGE UP (ref 27–34)
MCHC RBC-ENTMCNC: 32.8 GM/DL — SIGNIFICANT CHANGE UP (ref 32–36)
MCV RBC AUTO: 89.4 FL — SIGNIFICANT CHANGE UP (ref 80–100)
PHOSPHATE SERPL-MCNC: 3.3 MG/DL — SIGNIFICANT CHANGE UP (ref 2.4–4.7)
PLATELET # BLD AUTO: 161 K/UL — SIGNIFICANT CHANGE UP (ref 150–400)
POTASSIUM SERPL-MCNC: 4 MMOL/L — SIGNIFICANT CHANGE UP (ref 3.5–5.3)
POTASSIUM SERPL-SCNC: 4 MMOL/L — SIGNIFICANT CHANGE UP (ref 3.5–5.3)
RBC # BLD: 4.51 M/UL — SIGNIFICANT CHANGE UP (ref 3.8–5.2)
RBC # FLD: 13.9 % — SIGNIFICANT CHANGE UP (ref 10.3–14.5)
SODIUM SERPL-SCNC: 136 MMOL/L — SIGNIFICANT CHANGE UP (ref 135–145)
T4 FREE SERPL-MCNC: 1.2 NG/DL — SIGNIFICANT CHANGE UP (ref 0.9–1.8)
VIT B12 SERPL-MCNC: 1507 PG/ML — HIGH (ref 232–1245)
WBC # BLD: 8.87 K/UL — SIGNIFICANT CHANGE UP (ref 3.8–10.5)
WBC # FLD AUTO: 8.87 K/UL — SIGNIFICANT CHANGE UP (ref 3.8–10.5)

## 2021-08-12 PROCEDURE — 97166 OT EVAL MOD COMPLEX 45 MIN: CPT

## 2021-08-12 PROCEDURE — 99284 EMERGENCY DEPT VISIT MOD MDM: CPT

## 2021-08-12 PROCEDURE — 83036 HEMOGLOBIN GLYCOSYLATED A1C: CPT

## 2021-08-12 PROCEDURE — 93880 EXTRACRANIAL BILAT STUDY: CPT

## 2021-08-12 PROCEDURE — 70450 CT HEAD/BRAIN W/O DYE: CPT | Mod: MA

## 2021-08-12 PROCEDURE — 80048 BASIC METABOLIC PNL TOTAL CA: CPT

## 2021-08-12 PROCEDURE — 84439 ASSAY OF FREE THYROXINE: CPT

## 2021-08-12 PROCEDURE — 80061 LIPID PANEL: CPT

## 2021-08-12 PROCEDURE — 86769 SARS-COV-2 COVID-19 ANTIBODY: CPT

## 2021-08-12 PROCEDURE — C8929: CPT

## 2021-08-12 PROCEDURE — 85025 COMPLETE CBC W/AUTO DIFF WBC: CPT

## 2021-08-12 PROCEDURE — 86901 BLOOD TYPING SEROLOGIC RH(D): CPT

## 2021-08-12 PROCEDURE — 97163 PT EVAL HIGH COMPLEX 45 MIN: CPT

## 2021-08-12 PROCEDURE — G1004: CPT

## 2021-08-12 PROCEDURE — 85027 COMPLETE CBC AUTOMATED: CPT

## 2021-08-12 PROCEDURE — 84443 ASSAY THYROID STIM HORMONE: CPT

## 2021-08-12 PROCEDURE — 99284 EMERGENCY DEPT VISIT MOD MDM: CPT | Mod: 25

## 2021-08-12 PROCEDURE — 99285 EMERGENCY DEPT VISIT HI MDM: CPT

## 2021-08-12 PROCEDURE — 86850 RBC ANTIBODY SCREEN: CPT

## 2021-08-12 PROCEDURE — 84100 ASSAY OF PHOSPHORUS: CPT

## 2021-08-12 PROCEDURE — 99239 HOSP IP/OBS DSCHRG MGMT >30: CPT

## 2021-08-12 PROCEDURE — 81001 URINALYSIS AUTO W/SCOPE: CPT

## 2021-08-12 PROCEDURE — 86900 BLOOD TYPING SEROLOGIC ABO: CPT

## 2021-08-12 PROCEDURE — 80053 COMPREHEN METABOLIC PANEL: CPT

## 2021-08-12 PROCEDURE — 70450 CT HEAD/BRAIN W/O DYE: CPT

## 2021-08-12 PROCEDURE — 99232 SBSQ HOSP IP/OBS MODERATE 35: CPT

## 2021-08-12 PROCEDURE — 83735 ASSAY OF MAGNESIUM: CPT

## 2021-08-12 PROCEDURE — 85730 THROMBOPLASTIN TIME PARTIAL: CPT

## 2021-08-12 PROCEDURE — 36415 COLL VENOUS BLD VENIPUNCTURE: CPT

## 2021-08-12 PROCEDURE — 70450 CT HEAD/BRAIN W/O DYE: CPT | Mod: 26,MA

## 2021-08-12 PROCEDURE — 82607 VITAMIN B-12: CPT

## 2021-08-12 RX ORDER — PANTOPRAZOLE SODIUM 20 MG/1
40 TABLET, DELAYED RELEASE ORAL
Refills: 0 | Status: DISCONTINUED | OUTPATIENT
Start: 2021-08-12 | End: 2021-08-12

## 2021-08-12 RX ORDER — ONDANSETRON 8 MG/1
4 TABLET, FILM COATED ORAL ONCE
Refills: 0 | Status: COMPLETED | OUTPATIENT
Start: 2021-08-12 | End: 2021-08-12

## 2021-08-12 RX ORDER — AMLODIPINE BESYLATE 2.5 MG/1
10 TABLET ORAL DAILY
Refills: 0 | Status: DISCONTINUED | OUTPATIENT
Start: 2021-08-12 | End: 2021-08-12

## 2021-08-12 RX ORDER — LEVETIRACETAM 250 MG/1
1 TABLET, FILM COATED ORAL
Qty: 10 | Refills: 0
Start: 2021-08-12 | End: 2021-08-16

## 2021-08-12 RX ORDER — AMLODIPINE BESYLATE 2.5 MG/1
1 TABLET ORAL
Qty: 0 | Refills: 0 | DISCHARGE

## 2021-08-12 RX ORDER — ATORVASTATIN CALCIUM 80 MG/1
1 TABLET, FILM COATED ORAL
Qty: 30 | Refills: 0
Start: 2021-08-12 | End: 2021-09-10

## 2021-08-12 RX ORDER — AMLODIPINE BESYLATE 2.5 MG/1
1 TABLET ORAL
Qty: 30 | Refills: 0
Start: 2021-08-12 | End: 2021-09-10

## 2021-08-12 RX ADMIN — Medication 650 MILLIGRAM(S): at 09:15

## 2021-08-12 RX ADMIN — AMLODIPINE BESYLATE 5 MILLIGRAM(S): 2.5 TABLET ORAL at 05:19

## 2021-08-12 RX ADMIN — Medication 650 MILLIGRAM(S): at 05:47

## 2021-08-12 RX ADMIN — LEVETIRACETAM 500 MILLIGRAM(S): 250 TABLET, FILM COATED ORAL at 05:19

## 2021-08-12 RX ADMIN — ONDANSETRON 4 MILLIGRAM(S): 8 TABLET, FILM COATED ORAL at 00:38

## 2021-08-12 RX ADMIN — PANTOPRAZOLE SODIUM 40 MILLIGRAM(S): 20 TABLET, DELAYED RELEASE ORAL at 11:32

## 2021-08-12 RX ADMIN — Medication 650 MILLIGRAM(S): at 05:17

## 2021-08-12 RX ADMIN — ONDANSETRON 4 MILLIGRAM(S): 8 TABLET, FILM COATED ORAL at 05:40

## 2021-08-12 RX ADMIN — Medication 650 MILLIGRAM(S): at 10:24

## 2021-08-12 RX ADMIN — Medication 10 MILLIGRAM(S): at 02:10

## 2021-08-12 NOTE — PROGRESS NOTE ADULT - SUBJECTIVE AND OBJECTIVE BOX
spoke to neurosurgery about the 3rd ct scan and it shows improvement, cleared for dc to h ome with follow up with neurosurgery

## 2021-08-12 NOTE — OCCUPATIONAL THERAPY INITIAL EVALUATION ADULT - GENERAL OBSERVATIONS, REHAB EVAL
pt received sitting in bedside chair and left as found, no c/o pain, +left thumb spika splint, +Tele. +, and primafit in place

## 2021-08-12 NOTE — OCCUPATIONAL THERAPY INITIAL EVALUATION ADULT - ADDITIONAL COMMENTS
as per pt, was independent prior, lives alone however daughter is coming to stay with pt for several weeks and pt also has supportive family in the area, no DME prior

## 2021-08-12 NOTE — OCCUPATIONAL THERAPY INITIAL EVALUATION ADULT - GROSSLY INTACT, SENSORY
pt denies any numbness or tingling in b/l UE's, except c/o thumb decreased sensation)/Grossly Intact

## 2021-08-12 NOTE — OCCUPATIONAL THERAPY INITIAL EVALUATION ADULT - RANGE OF MOTION EXAMINATION, UPPER EXTREMITY
except left hand in spika splint and wrist immobilized and able to move all digits except thum/bilateral UE Active ROM was WNL (within normal limits)

## 2021-08-12 NOTE — PROGRESS NOTE ADULT - ASSESSMENT
86 yo F with PMH of HTN who presents from [facility] with traumatic SDH. Patient felt dizzy and fell without LOC. Patient was taken to [facility], found to have a small SDH with midline shift.     Plan:  -D/w attending   -Q2hr neuro checks   -Keppra 500 BID x 7 days for seizure prophylaxis   -Recommend repeat CTH in am   -SBP goal 100-140  -Amlodipine 10 mg daily   -Hydralazine/Labetalol PRN  -Atorvastatin 40mg daily   -SpO2 adequate on room air, maintain>92%  -DASH diet   -Zofran PRN  -Protonix 40 mg daily   -SCDs b/l for DVT prophylaxis   -Hold AC/AP at this time   -Encourage ambulation/OOB with assist   -PT/OT  -Further medical management/supportive care per primary team  88 yo F with PMH of HTN who presents from [facility] with traumatic SDH. Patient felt dizzy and fell without LOC. Patient was taken to [facility], found to have a small SDH with midline shift.     Plan:  -D/w attending   -Q2hr neuro checks   -Keppra 500 BID x 7 days for seizure prophylaxis   -Recommend repeat CTH in am   -SBP goal 100-140  -Amlodipine 10 mg daily   -Hydralazine/Labetalol PRN  -Atorvastatin 40mg daily   -SpO2 adequate on room air, maintain>92%  -DASH diet   -Zofran PRN  -Protonix 40 mg daily   -SCDs b/l for DVT prophylaxis   -Hold AC/AP at this time   -Encourage ambulation/OOB with assist   -PT/OT  -Further medical management/supportive care per primary team     --    NSGY Attg:    see above    patient seen and examined by PA staff    agree with exam and plan as above

## 2021-08-12 NOTE — DISCHARGE NOTE PROVIDER - PROVIDER TOKENS
PROVIDER:[TOKEN:[[id number]]],FREE:[LAST:[primary care],PHONE:[(   )    -],FAX:[(   )    -],ADDRESS:[pcp]]

## 2021-08-12 NOTE — CHART NOTE - NSCHARTNOTEFT_GEN_A_CORE
HPI:  86 y/o woman transfer from [facility] after fall down four concrete steps.  Imaging at [facility] revealed Left frontal/temporal SDH.  No LOC.  C-spine cleared by [facility].  Pt. not on any anticoagulation.  No gross long bone fractures noted.  Multiple abrasions including one left frontal skull laceration with small hematoma. (10 Aug 2021 15:21)    INTERVAL HISTORY:  Patient was discharged earlier today, but neurosurgery requested repeat CTH prior to D/C. Patient arrived at ED for the repeat CTH, brought in by daughter.     RADIOLOGY:  CT Head No Cont (08.12.21 @ 15:06):  COMPARISON: CT head 8/11/2021  FINDINGS:  There is mild diffuse parenchymal volume loss. There are areas of low attenuation in the periventricular white matter likely related to moderate chronic microvascular ischemic changes.  Small left acute left subdural hematoma without significant change in the left temporal region but slightly smaller in the left parietal region on the coronal plane measuring 6 mm in maximum width, previously measuring 7 mm.  There is no midline shift. There is no acute territorial infarct. There is no hydrocephalus.  The cranium is intact. Left frontal soft tissue swelling/hematoma. The visualized paranasal sinuses are well-aerated.  IMPRESSION:  Left subdural hematoma as described above    CT Head No Cont (08.11.21 @ 23:04):  IMPRESSION:  Left temporal and parietal convexity subdural hematoma with new component of subdural hematoma over the left parietal convexity. Maximum thickness of the subdural hematoma or the left parietal convexity 7 mm. No significant mass effect or shift of the midline.    CT Head No Cont (08.10.21 @ 16:03):  IMPRESSION:  Comparison 8/10/2021 at 11:06 AM (acc# [id number]), no change left frontotemporal 6.7 mm subdural hemorrhage, left frontal parietal 5 mm possible subarachnoid or intraparenchymal hemorrhagic contusion, 2 mm rightward shift, volume loss, microvascular disease, basal cisterns remain patent. Redemonstration left frontotemporal scalp soft tissue swelling with interval subcutaneous emphysema (3:37). If symptoms persist consider follow-up head CT or MRI if no contraindications.      88 yo F with PMH of HTN who presents from [facility] with traumatic SDH. Patient felt dizzy and fell without LOC. Patient was taken to [facility], found to have a small SDH with midline shift. Patient had repeat CTH in ED today 8/12, which was read as stable.     Plan:  -D/w attending   -Keppra 500 BID x 7 days for seizure prophylaxis   -SBP goal 100-140  -Hold AC/AP at this time   -No absolute neurosurgical contraindication to discharge at this time as patient remains neurologically intact and repeat CTH is stable   -Follow up with Dr. Mota in office within 2 weeks of discharge   -Return to ED with any new or worsening symptoms - such as worsening headache, dizziness, nausea, vomiting, weakness, paresthesias, uncontrolled movements, etc.   -Discussed with Dr. Gavin Vidales (hospitalist) HPI:  86 y/o woman [place] [facility] [place] four concrete steps.  Imaging at [facility] revealed Left frontal/temporal SDH.  No LOC.  C-spine cleared by [facility].  Pt. not on any anticoagulation.  No gross long bone fractures noted.  Multiple abrasions including one left frontal skull laceration with small hematoma. (10 Aug 2021 15:21)    INTERVAL HISTORY:  Patient was discharged earlier today, but neurosurgery requested repeat CTH prior to D/C. Patient arrived at ED for the repeat CTH, brought in by daughter.     RADIOLOGY:  CT Head No Cont (08.12.21 @ 15:06):  COMPARISON: CT head 8/11/2021  FINDINGS:  There is mild diffuse parenchymal volume loss. There are areas of low attenuation in the periventricular white matter likely related to moderate chronic microvascular ischemic changes.  Small left acute left subdural hematoma without significant change in the left temporal region but slightly smaller in the left parietal region on the coronal plane measuring 6 mm in maximum width, previously measuring 7 mm.  There is no midline shift. There is no acute territorial infarct. There is no hydrocephalus.  The cranium is intact. Left frontal soft tissue swelling/hematoma. The visualized paranasal sinuses are well-aerated.  IMPRESSION:  Left subdural hematoma as described above    CT Head No Cont (08.11.21 @ 23:04):  IMPRESSION:  Left temporal and parietal convexity subdural hematoma with new component of subdural hematoma over the left parietal convexity. Maximum thickness of the subdural hematoma or the left parietal convexity 7 mm. No significant mass effect or shift of the midline.    CT Head No Cont (08.10.21 @ 16:03):  IMPRESSION:  Comparison 8/10/2021 at 11:06 AM (acc# [id number]), no change left frontotemporal 6.7 mm subdural hemorrhage, left frontal parietal 5 mm possible subarachnoid or intraparenchymal hemorrhagic contusion, 2 mm rightward shift, volume loss, microvascular disease, basal cisterns remain patent. Redemonstration left frontotemporal scalp soft tissue swelling with interval subcutaneous emphysema (3:37). If symptoms persist consider follow-up head CT or MRI if no contraindications.      86 yo F with PMH of HTN who presents from [facility] with traumatic SDH. Patient felt dizzy and fell without LOC. Patient was [place] [facility], found to have a small SDH with midline shift. Patient had repeat CTH in ED today 8/12, which was read as stable.     Plan:  -D/w attending   -Keppra 500 BID x 7 days for seizure prophylaxis   -SBP goal 100-140  -Hold AC/AP at this time   -No absolute neurosurgical contraindication to discharge at this time as patient remains neurologically intact and repeat CTH is stable   -Follow up with Dr. Mota in office within 2 weeks of discharge   -Return to ED with any new or worsening symptoms - such as worsening headache, dizziness, nausea, vomiting, weakness, paresthesias, uncontrolled movements, etc.   -Discussed with Dr. Gavin Vidales (hospitalist)    --    NSGY Attg:    see above    patient seen and examined by PA staff    repeat CDT stable    agree with above

## 2021-08-12 NOTE — ED PROVIDER NOTE - CARE PROVIDER_API CALL
Warren Mota)  Neurosurgery  [address] [place] [address]  Phone: [phone]  Fax: [phone]  Follow Up Time:

## 2021-08-12 NOTE — PROGRESS NOTE ADULT - SUBJECTIVE AND OBJECTIVE BOX
HPI:  86 y/o woman transfer from [facility] after fall down four concrete steps.  Imaging at [facility] revealed Left frontal/temporal SDH.  No LOC.  C-spine cleared by [facility].  Pt. not on any anticoagulation.  No gross long bone fractures noted.  Multiple abrasions including one left frontal skull laceration with small hematoma. (10 Aug 2021 15:21)    INTERVAL HPI/OVERNIGHT EVENTS:  87y Female seen and examined by neurosurgery team, sitting comfortably in chair. Patient reports mild headache today. Downgraded from NSICU yesterday . No acute overnight events reported.     Vital Signs Last 24 Hrs  T(C): 36.8 (12 Aug 2021 11:34), Max: 36.8 (12 Aug 2021 00:15)  T(F): 98.2 (12 Aug 2021 11:34), Max: 98.2 (12 Aug 2021 00:15)  HR: 91 (12 Aug 2021 11:34) (77 - 103)  BP: 119/67 (12 Aug 2021 11:34) (105/64 - 171/91)  BP(mean): 92 (12 Aug 2021 04:00) (75 - 113)  RR: 16 (12 Aug 2021 11:34) (12 - 33)  SpO2: 96% (12 Aug 2021 11:34) (93% - 99%)    PHYSICAL EXAM:  GENERAL: NAD, calm, pleasant  HEAD: L forehead abrasion, normocephalic  DARREN COMA SCORE: E-4 V-5 M-6 = 15  MENTAL STATUS: AAO x3; Awake; Opens eyes spontaneously; Appropriately conversant without aphasial; Following commands   CRANIAL NERVES: PERRL. EOMI without nystagmus. Facial sensation intact V1-3 distribution b/l. Face symmetric w/ normal eye closure and smile, tongue midline. Hearing grossly intact. Speech clear. Head turning and shoulder shrug intact.   MOTOR: strength 5/5 b/l upper and lower extremities  SENSATION: grossly intact to light touch all extremities  CHEST/LUNG: Nonlabored breathing, no signs of respiratory distress   HEART: +S1/+S2; Regular rate and rhythm  ABDOMEN: Soft, nontender, nondistended  EXTREMITIES:  2+ peripheral pulses b/l  SKIN: Warm, dry    LABS:                        13.2   8.87  )-----------( 161      ( 12 Aug 2021 05:53 )             40.3         136  |  102  |  8.4  ----------------------------<  121<H>  4.0   |  23.0  |  0.73    Ca    9.3      12 Aug 2021 05:53  Phos  3.3       Mg     2.1         TPro  6.6  /  Alb  3.8  /  TBili  0.3<L>  /  DBili  x   /  AST  16  /  ALT  8   /  AlkPhos  56  -10    PTT - ( 10 Aug 2021 14:51 )  PTT:32.2 sec  Urinalysis Basic - ( 10 Aug 2021 17:55 )    Color: Yellow / Appearance: Clear / S.005 / pH: x  Gluc: x / Ketone: Negative  / Bili: Negative / Urobili: Negative mg/dL   Blood: x / Protein: Negative mg/dL / Nitrite: Negative   Leuk Esterase: Negative / RBC: 0-2 /HPF / WBC 0-2   Sq Epi: x / Non Sq Epi: Occasional / Bacteria: Occasional         @ 07:  -   @ 07:00  --------------------------------------------------------  IN: 400 mL / OUT: 775 mL / NET: -375 mL     @ 07: @ 12:21  --------------------------------------------------------  IN: 120 mL / OUT: 0 mL / NET: 120 mL        RADIOLOGY & ADDITIONAL TESTS:  CT Head No Cont (21 @ 23:04):  IMPRESSION:  Left temporal and parietal convexity subdural hematoma with new component of subdural hematoma over the left parietal convexity. Maximum thickness of the subdural hematoma or the left parietal convexity 7 mm. No significant mass effect or shift of the midline.    CT Head No Cont (08.10.21 @ 16:03):  IMPRESSION:  Comparison 8/10/2021 at 11:06 AM (acc# [id number]), no change left frontotemporal 6.7 mm subdural hemorrhage, left frontal parietal 5 mm possible subarachnoid or intraparenchymal hemorrhagic contusion, 2 mm rightward shift, volume loss, microvascular disease, basal cisterns remain patent. Redemonstration left frontotemporal scalp soft tissue swelling with interval subcutaneous emphysema (3:37). If symptoms persist consider follow-up head CT or MRI if no contraindications.    
88 yo F with PMH of HTN who presents with traumatic SDH. Patient reports that she was feeling well this morning. Reports that she was walking outside through her back door at which time she felt dizzy, fell down a few steps, and hit her head on the concrete. Reports that she has been feeling intermittently dizzy over the past week, does not feel it is associated with standing from sitting, change in position, etc. Patient denies chest pain, palpitations, SOB associated with dizziness/fall. Patient was [place] [facility] found to have L SDH with minimal shift. Tx to [facility] for further workup and treatment. Denies hx of fevers, weight loss, nausea, vomiting, AC/AP use. Currently has no complaints, including denies headache. (8/10/2021)  Of note, pt's daughter reveals that pt is suffering from depression after she lost her  approx a year ago, that she has poor PO intake.  Admitted to NCCU.    No o/n events reported.  Orthostatics checked this am - positive.   VS, labs, imaging reviewed.  ROS: episodes of dizziness and generalized weakness.    Exam:  NAD, cooperative.  L forehead laceration with 2 stiches.  AOx3, speech fluent, comprehension intact, PER, EOMI, no visual deficit, motor - 5/5 x4, sensation intact to LT.  CTAB  S1S2 present  Abd soft, NT  No peripheral swelling   R thumb in cast.  
HPI:  88 y/o woman transfer from [facility] after fall down four concrete steps.  Imaging at [facility] revealed Left frontal/temporal SDH.  No LOC.  C-spine cleared by [facility].  Pt. not on any anticoagulation.  No gross long bone fractures noted.  Multiple abrasions including one left frontal skull laceration with small hematoma. (10 Aug 2021 15:21)      Interval History:  24 Hour Events: No acute events.    1. Patient's Neurologic Exam unchanged.    2. Patient's Hemodynamic's maintained without drips.    3. Patient's Respiratory status is: adequate    4. Patient is pending: Patient is pending further neurologic recovery, further neurologic, respiratory, and hemodynamic monitoring & management in the ICU.     5. Dispo: ICU for now, downgrade when clinically stable.         Physical Exam:  Constitutional: NAD    Neuro  * Mental Status:  GCS 15:  E(4), V(5), M(6).  Awake, alert, oriented to conversation.  * Cranial Nerves: Cnii-Cnxii grossly intact. PERRL, EOMI, tongue midline, no gaze deviation  * Motor: RUE 5/5, LUE 5/5, RLE 5/5, LLE 5/5  * Sensory: Sensation intact to light touch  * Reflexes: Not assessed  * Gait: Not assessed    Cardiovascular:  S1, S2 no murmurs appreciated.  Regular rate and rhythm.  Eyes: See neurologic examination with detailed examination of eyes.  ENT: No JVD, Trachea Midline.  Respiratory: Clear to auscultation.  Gastrointestinal: Soft, nontender, nondistended.  Genitourinary: [ ] Narvaez, [ x ] No Narvaez.   Musculoskeletal: No muscle wasting noted, (See neuorlogic assessment for full muscle strength assessment) No pretibial edema appreciated, no appreciable calf tenderness.  Skin:  Right hand splint, right thumb swollen and echymotic  Hematologic / Lymph / Immunologic: No bleeding from IV sites or wounds, No lymphadenopathy, No Hives or allergic type skin lesions      Vitals:  Vital Signs Last 24 Hrs  T(C): 36.6 (11 Aug 2021 04:04), Max: 36.8 (10 Aug 2021 13:47)  T(F): 97.8 (11 Aug 2021 04:04), Max: 98.2 (10 Aug 2021 13:47)  HR: 67 (11 Aug 2021 03:00) (63 - 77)  BP: 155/68 (11 Aug 2021 03:00) (120/94 - 173/91)  BP(mean): 93 (11 Aug 2021 03:00) (76 - 102)  RR: 16 (11 Aug 2021 03:00) (14 - 22)  SpO2: 95% (11 Aug 2021 03:00) (92% - 99%)    I&O:  I&O's Summary    10 Aug 2021 07:01  -  11 Aug 2021 04:26  --------------------------------------------------------  IN: 100 mL / OUT: 900 mL / NET: -800 mL        Labs & Radiology:                        13.0   9.86  )-----------( 194      ( 10 Aug 2021 14:51 )             41.7       08-10    141  |  107  |  12.9  ----------------------------<  135<H>  4.2   |  22.0  |  0.93    Ca    9.1      10 Aug 2021 14:51    TPro  6.6  /  Alb  3.8  /  TBili  0.3<L>  /  DBili  x   /  AST  16  /  ALT  8   /  AlkPhos  56  08-10      LIVER FUNCTIONS - ( 10 Aug 2021 14:51 )  Alb: 3.8 g/dL / Pro: 6.6 g/dL / ALK PHOS: 56 U/L / ALT: 8 U/L / AST: 16 U/L / GGT: x                   PTT - ( 10 Aug 2021 14:51 )  PTT:32.2 sec            CAPILLARY BLOOD GLUCOSE          Neurosurgery Imaging:      Medications:  MEDICATIONS  (STANDING):  amLODIPine   Tablet 5 milliGRAM(s) Oral daily  levETIRAcetam 500 milliGRAM(s) Oral every 12 hours  nitrofurantoin monohydrate/macrocrystals (MACROBID) 100 milliGRAM(s) Oral two times a day  pantoprazole   Suspension 40 milliGRAM(s) Oral daily    MEDICATIONS  (PRN):  acetaminophen   Tablet .. 650 milliGRAM(s) Oral every 6 hours PRN Moderate Pain (4 - 6)  hydrALAZINE Injectable 10 milliGRAM(s) IV Push every 2 hours PRN SBP >140  labetalol Injectable 10 milliGRAM(s) IV Push every 2 hours PRN SBP >140  ondansetron Injectable 4 milliGRAM(s) IV Push every 6 hours PRN Nausea and/or Vomiting      Assessment:    87f fall down 4 steps, has left convexity SDH    Continue Neuro Checks  Maintain BP within desired range  Continue diet  Medical care per Neuro ICU  No acute neurosurgical intervention is indicated at this time, will continue to follow  Subdural is small, likely downgrade 8/11        
Patient evaluated custom MX fitted for RT thumb spica WHFO  UE orthosis brace as ordered by Orthopedics delivered  by [facility] [phone]
SAMANTHA DONNELLY    [id number]    87y      Female    INTERVAL HPI/OVERNIGHT EVENTS: patient being seen for traumatic sdh. Patient seen at bedside with daughter and complains of fatigue    REVIEW OF SYSTEMS:    CONSTITUTIONAL: fatigue  RESPIRATORY: No cough, wheezing, hemoptysis; No shortness of breath  CARDIOVASCULAR: No chest pain, palpitations  GASTROINTESTINAL: No abdominal or epigastric pain. No nausea, vomiting  NEUROLOGICAL: No headaches, memory loss, loss of strength.  MISCELLANEOUS:      Vital Signs Last 24 Hrs  T(C): 36.6 (11 Aug 2021 15:43), Max: 36.7 (10 Aug 2021 20:30)  T(F): 97.9 (11 Aug 2021 15:43), Max: 98 (10 Aug 2021 20:30)  HR: 77 (11 Aug 2021 17:00) (63 - 83)  BP: 147/79 (11 Aug 2021 17:00) (74/61 - 160/71)  BP(mean): 100 (11 Aug 2021 17:00) (67 - 101)  RR: 18 (11 Aug 2021 17:00) (15 - 33)  SpO2: 99% (11 Aug 2021 17:00) (92% - 99%)    PHYSICAL EXAM:    GENERAL: NAD, pleasant  HEENT: left forehead abrasion   NECK: soft, Supple, No JVD,   CHEST/LUNG: Clear to auscultation bilaterally; No wheezing  HEART: S1S2+, Regular rate and rhythm; No murmurs, rubs, or gallops  ABDOMEN: Soft, Nontender, Nondistended; Bowel sounds present  EXTREMITIES:  2+ Peripheral Pulses,   SKIN: No rashes or lesions  NEURO: AAOX3, no focal deficits,       LABS:                        13.0   9.86  )-----------( 194      ( 10 Aug 2021 14:51 )             41.7     08-10    141  |  107  |  12.9  ----------------------------<  135<H>  4.2   |  22.0  |  0.93    Ca    9.1      10 Aug 2021 14:51    TPro  6.6  /  Alb  3.8  /  TBili  0.3<L>  /  DBili  x   /  AST  16  /  ALT  8   /  AlkPhos  56  08-10    PTT - ( 10 Aug 2021 14:51 )  PTT:32.2 sec  Urinalysis Basic - ( 10 Aug 2021 17:55 )    Color: Yellow / Appearance: Clear / S.005 / pH: x  Gluc: x / Ketone: Negative  / Bili: Negative / Urobili: Negative mg/dL   Blood: x / Protein: Negative mg/dL / Nitrite: Negative   Leuk Esterase: Negative / RBC: 0-2 /HPF / WBC 0-2   Sq Epi: x / Non Sq Epi: Occasional / Bacteria: Occasional          MEDICATIONS  (STANDING):  amLODIPine   Tablet 5 milliGRAM(s) Oral daily  atorvastatin 40 milliGRAM(s) Oral at bedtime  levETIRAcetam 500 milliGRAM(s) Oral every 12 hours  melatonin 1 milliGRAM(s) Oral at bedtime  nitrofurantoin macrocrystals (MACRODANTIN) 50 milliGRAM(s) Oral <User Schedule>  pantoprazole   Suspension 40 milliGRAM(s) Oral daily    MEDICATIONS  (PRN):  acetaminophen   Tablet .. 650 milliGRAM(s) Oral every 6 hours PRN Moderate Pain (4 - 6)  hydrALAZINE Injectable 10 milliGRAM(s) IV Push every 2 hours PRN SBP >140  labetalol Injectable 10 milliGRAM(s) IV Push every 2 hours PRN SBP >140  ondansetron Injectable 4 milliGRAM(s) IV Push every 6 hours PRN Nausea and/or Vomiting  traMADol 25 milliGRAM(s) Oral every 6 hours PRN Severe Pain (7 - 10)      RADIOLOGY & ADDITIONAL TESTS:    carotids pending

## 2021-08-12 NOTE — ED PROVIDER NOTE - NSFOLLOWUPINSTRUCTIONS_ED_ALL_ED_FT
Intracranial Hematoma    WHAT YOU NEED TO KNOW:    What is an intracranial hematoma? An intracranial hematoma is a collection of blood inside your skull. The blood leaks from a tear or rupture in a vein or artery, such as after a hemorrhagic stroke. The collected blood puts pressure on the brain. Serious medical problems can develop, such as seizures or a coma. An intracranial hematoma is a life-threatening emergency that needs immediate medical care.    What are the warning signs of a stroke? The words BE FAST can help you remember and recognize warning signs of a stroke:  •B = Balance: Sudden loss of balance      •E = Eyes: Loss of vision in one or both eyes      •F = Face: Face droops on one side      •A = Arms: Arm drops when both arms are raised      •S = Speech: Speech is slurred or sounds different      •T = Time: Time to get help immediately    BE FAST SIGNS OF A STROKE         What are the types of intracranial hematoma?   •Intracerebral (or intraparenchymal) hematoma is blood that collects within brain tissue.      •Subdural hematoma is blood that collects between your brain and its protective cover.      •Epidural hematoma is blood that collects between your brain's protective cover and your skull.      What are the signs and symptoms of an intracranial hematoma? Signs and symptoms may develop over minutes, hours, or years. You may have any of the following, depending on where the hematoma happened:  •Headache, seizures, or confusion      •Weak or numb areas, or a stiff neck      •Trouble speaking, or slurred speech      •Fainting, loss of consciousness, or being less alert      •Nausea and vomiting      •Trouble walking or keeping your balance      •Vision problems      What causes or increases my risk for an intracranial hematoma?   •A head injury, such as a blow to the head or from a sudden stop after fast motion      •Blood thinning medicine      •Older age      •High blood pressure that is not controlled      •Medical conditions such as diabetes, liver disease, or blood vessel weakness      •Use of alcohol or illegal drugs such as cocaine or heroin      How is an intracranial hematoma diagnosed? Your healthcare provider will check for high blood pressure or another medical condition that increases your risk for bleeding. You may also need any of the following:   •CT or MRI pictures are used to find the bleeding and check for damage to your brain. Do not enter the MRI room with anything metal. Metal can cause serious injury. Tell the healthcare provider if you have any metal in or on your body.      •Blood tests may be used to check your overall health. The tests may include a check for diabetes or a blood clotting disorder. Diabetes or a blood clotting disorder can increase your risk for a stroke that can lead to a hematoma.      How is an intracranial hematoma treated?   •Bed rest may be needed if you have a small amount of blood in the dura. Healthcare providers may want to see you often or do CT or MRI scans to be sure there is no more bleeding.      •Medicines are used to prevent seizures, lower swelling, and get rid of extra fluid.      •A catheter (thin tube) may be placed in your skull to drain fluid. This will help decrease pressure in your brain and prevent more damage. The tube may also monitor pressure in your brain.      •Surgery may be used to remove the blood.      What can I do to manage or prevent an intracranial hematoma? Healthcare providers will help you create goals for your recovery. The following lifestyle changes can help lower your risk for a stroke that can lead to a hematoma:   •Manage health conditions. A condition such as diabetes can increase your risk for a stroke. Control your blood sugar level if you have hyperglycemia or diabetes. Take your prescribed medicines and check your blood sugar level as directed.  How to check your blood sugar           •Check your blood pressure as directed. High blood pressure can increase your risk for a stroke. Follow your healthcare provider’s directions for controlling your blood pressure.   How to take a Blood Pressure           •Limit alcohol. Large amounts of alcohol can lead to an intracerebral hematoma or a stroke. Alcohol may also raise your blood pressure or thin your blood. Blood thinning can cause a hemorrhagic stroke.      •Do not use nicotine products or illegal drugs. Nicotine and other chemicals in cigarettes and cigars can cause blood vessel damage. Nicotine and illegal drugs both increase your risk for a stroke. Ask your healthcare provider for information if you currently smoke or use drugs and need help to quit. E-cigarettes or smokeless tobacco still contain nicotine. Talk to your healthcare provider before you use these products.      •Take blood thinners exactly as directed. Blood thinners increase your risk for an intracerebral hematoma. Your healthcare provider may make changes to your blood thinner if it caused your hematoma. Always follow directions so you do not take too much of this medicine.      •Eat a variety of healthy foods. Healthy foods include whole-grain breads, low-fat dairy products, beans, lean meats, and fish. Eat at least 5 servings of fruits and vegetables each day. Choose foods that are low in fat, cholesterol, salt, and sugar. Eat foods that are high in potassium, such as potatoes and bananas. A nutritionist can help you create healthy meal plans.   Healthy Foods           •Maintain a healthy weight. Ask your healthcare provider what a healthy weight is for you. Ask him or her to help you create a weight loss plan if you are overweight. He or she can help you create small goals if you have a lot of weight to lose.      •Exercise as directed. Exercise can lower your blood pressure, cholesterol, weight, and blood sugar levels. Healthcare providers will help you create exercise goals. They can also help you make a plan to reach your goals. For example, you can break exercise into 10 minute periods, 3 times in a day. Find an exercise that you enjoy. This will make it easier for you to reach your exercise goals.   FAMILY WALKING FOR EXERCISE           •Manage stress. Stress can raise your blood pressure. Find new ways to relax, such as deep breathing or listening to music.      Where can I get support and more information?   •American Heart Association and American Stroke Association  [address] [place] [address]  Phone: 1-910.164.7079  Web Address: http://www.heart.org        Have someone call your local emergency number (911 in the [place]) if:   •You have any of the following signs of a stroke: ?Numbness or drooping on one side of your face       ?Weakness in an arm or leg      ?Confusion or difficulty speaking      ?Dizziness, a severe headache, or vision loss    BE FAST SIGNS OF A STROKE         •You have a seizure.      •You have new problems with balance or movement.      When should I seek immediate care?   •You are sleepier or are harder to wake than usual.      •You have problems thinking.      •Your behavior or personality has changed.      •You have repeated or forceful vomiting or you cannot keep liquids down.      When should I call my doctor?   •You have nausea or are vomiting.      •Your symptoms are getting worse.      •You have questions or concerns about your condition or care.      CARE AGREEMENT:    You have the right to help plan your care. Learn about your health condition and how it may be treated. Discuss treatment options with your healthcare providers to decide what care you want to receive. You always have the right to refuse treatment.

## 2021-08-12 NOTE — PROGRESS NOTE ADULT - REASON FOR ADMISSION
Trauma, fall.  Imaging at [facility] revealed Left frontal/temporal SDH.  No LOC
Trauma, fall.  Imaging at [facility] revealed Left frontal/temporal SDH.  No LOC
Trauma, fall.  Imaging at [facility] revealed Left frontal/temporal SDH.  No LOC
Trauma, fall.  Imaging at [facility] revealed Left frontal/temporal SDH.  No LOC
Trauma, fall.  Imaging at [facility] revealed Left frontal/temporal SDH.  No LOC

## 2021-08-12 NOTE — DISCHARGE NOTE PROVIDER - NSDCMRMEDTOKEN_GEN_ALL_CORE_FT
amLODIPine 10 mg oral tablet: 1 tab(s) orally once a day  atorvastatin 40 mg oral tablet: 1 tab(s) orally once a day (at bedtime)  levETIRAcetam 500 mg oral tablet: 1 tab(s) orally every 12 hours  nitrofurantoin macrocrystals 50 mg oral capsule: 1 cap(s) orally once a day  omeprazole 40 mg oral delayed release capsule: 1 cap(s) orally once a day  raloxifene 60 mg oral tablet: 1 tab(s) orally once a day

## 2021-08-12 NOTE — DISCHARGE NOTE PROVIDER - REASON FOR ADMISSION
Trauma, fall.  Imaging at [facility] revealed Left frontal/temporal SDH.  No LOC

## 2021-08-12 NOTE — DISCHARGE NOTE PROVIDER - CARE PROVIDER_API CALL
Miki Friedman)  Cardiology  [address] [place] [address]  Phone: [phone]  Fax: [phone]  Follow Up Time:     primary care,   [facility]  Phone: (   )    -  Fax: (   )    -  Follow Up Time:

## 2021-08-12 NOTE — ED PROVIDER NOTE - NS ED ROS FT
Const: Denies fever, chills  HEENT: Denies blurry vision, sore throat  Neck: Denies neck pain/stiffness  Resp: Denies coughing, SOB  Cardiovascular: Denies CP, palpitations, LE edema  GI: Denies nausea, vomiting, abdominal pain, diarrhea, constipation, blood in stool  : Denies urinary frequency/urgency/dysuria, hematuria  MSK: Denies back pain  Neuro: + HA, + dizziness, numbness, weakness  Skin: Denies rashes.

## 2021-08-12 NOTE — DISCHARGE NOTE PROVIDER - NSDCCPCAREPLAN_GEN_ALL_CORE_FT
PRINCIPAL DISCHARGE DIAGNOSIS  Diagnosis: Traumatic subdural hematoma  Assessment and Plan of Treatment:       SECONDARY DISCHARGE DIAGNOSES  Diagnosis: Fall  Assessment and Plan of Treatment:     Diagnosis: Insomnia  Assessment and Plan of Treatment:     Diagnosis: Hypertension  Assessment and Plan of Treatment:

## 2021-08-12 NOTE — ED ADULT TRIAGE NOTE - CHIEF COMPLAINT QUOTE
daughter abilio pt had a fall on 8/10, was admitted to ICU, and discharged a couple hours ago from stepdown but was called to come back for CT scan, as she was supposed to get the CT scan before discharge.

## 2021-08-12 NOTE — ED PROVIDER NOTE - OBJECTIVE STATEMENT
88 y/o F with PMH HTN who was transferred from [facility] earlier this week for a fall with resulting subdural and right distal phalanx fracture. She was discharged earlier today, but neurosurgery wanted her to have another CT prior to discharge. She was called back to have CT. She complains of dizziness and headache. She denies blurry vision, nausea, vomiting, numbness or weakness. Her daughter states she is very tired. She has no other complaints.

## 2021-08-12 NOTE — DISCHARGE NOTE PROVIDER - HOSPITAL COURSE
Patient is an 86 yo F with PMH of HTN who presents from [facility] with traumatic SDH. Patient felt dizzy and fell without LOC. Patient was taken to [facility], found to have a small SDH with midline shift. Patient admitted to NSICU and had ct head:    , no change left frontotemporal 6.7 mm subdural hemorrhage, left frontal parietal 5 mm possible subarachnoid or intraparenchymal hemorrhagic contusion, 2 mm rightward shift, volume loss, microvascular disease, basal cisterns remain patent. Redemonstration left frontotemporal scalp soft tissue swelling with interval subcutaneous emphysema (3:37)    8/12:    IMPRESSION:    Left temporal and parietal convexity subdural hematoma with new component of subdural hematoma over the left parietal convexity. Maximum thickness of the subdural hematoma or the left parietal convexity 7 mm. No significant mass effect or shift of the midline.    tte:  normal     carotids normal        Problem/Plan - 1:  ·  Problem: Traumatic subdural hematoma.  Plan: neurosurgery following  - carotids negative  - c.w keppra for 5 days     Problem/Plan - 2:  ·  Problem: Hypertension.  Plan: maintain -150  - c.w norvasc.      Problem/Plan - 3:  ·  Problem: Fall.  Plan: pt consult appreciated  - h ome with home care.     time spent on dcv 34 minutes    pe  GENERAL: NAD, pleasant  HEENT: left forehead abrasion   NECK: soft, Supple, No JVD,   CHEST/LUNG: Clear to auscultation bilaterally; No wheezing  HEART: S1S2+, Regular rate and rhythm; No murmurs, rubs, or gallops  ABDOMEN: Soft, Nontender, Nondistended; Bowel sounds present  EXTREMITIES:  2+ Peripheral Pulses,   SKIN: No rashes or lesions  NEURO: AAOX3, no focal deficits, Patient is an 86 yo F with PMH of HTN who presents from [facility] with traumatic SDH. Patient felt dizzy and fell without LOC. Patient was taken to [facility], found to have a small SDH with midline shift. Patient admitted to NSICU and had ct head:    , no change left frontotemporal 6.7 mm subdural hemorrhage, left frontal parietal 5 mm possible subarachnoid or intraparenchymal hemorrhagic contusion, 2 mm rightward shift, volume loss, microvascular disease, basal cisterns remain patent. Redemonstration left frontotemporal scalp soft tissue swelling with interval subcutaneous emphysema (3:37)    8/12:    IMPRESSION:    Left temporal and parietal convexity subdural hematoma with new component of subdural hematoma over the left parietal convexity. Maximum thickness of the subdural hematoma or the left parietal convexity 7 mm. No significant mass effect or shift of the midline.    tte:  normal     carotids normal      Problem/Plan - 1:  ·  Problem: Traumatic subdural hematoma.  Plan: neurosurgery following  - carotids negative  - c.w keppra for 5 days     Problem/Plan - 2:  ·  Problem: Hypertension.  Plan: maintain -150  - c.w norvasc.      Problem/Plan - 3:  ·  Problem: Fall.  Plan: pt consult appreciated  - h ome with home care.     time spent on dcv 34 minutes    pe  GENERAL: NAD, pleasant  HEENT: left forehead abrasion   NECK: soft, Supple, No JVD,   CHEST/LUNG: Clear to auscultation bilaterally; No wheezing  HEART: S1S2+, Regular rate and rhythm; No murmurs, rubs, or gallops  ABDOMEN: Soft, Nontender, Nondistended; Bowel sounds present  EXTREMITIES:  2+ Peripheral Pulses,   SKIN: No rashes or lesions  NEURO: AAOX3, no focal deficits    Disposition: Stable for discharge.    Discharge planning time 35 minutes.

## 2021-08-12 NOTE — ED PROVIDER NOTE - PATIENT PORTAL LINK FT
You can access the FollowMyHealth Patient Portal offered by [facility] by registering at the following website: [email or web address]. By joining [employer]’s FollowMyHealth portal, you will also be able to view your health information using other applications (apps) compatible with our system.

## 2021-08-18 NOTE — CDI QUERY NOTE - NSCDIOTHERTXTBX_GEN_ALL_CORE_HH
Can you please clarify if there is a clinically significant diagnosis associated with the 2mm midline shift found in the imaging?  A.	Cerebral Herniation, resolved  B.	Brain compression, resolved  C.	Other, please specify  D.	Not clinically significant    EXAM:  CT BRAIN                        PROCEDURE DATE:  08/10/2021    IMPRESSION:  Comparison 8/10/2021 at 11:06 AM (acc# [id number]), no change left frontotemporal 6.7 mm subdural hemorrhage, left frontal parietal 5 mm possible subarachnoid or intraparenchymal hemorrhagic contusion, 2 mm rightward shift, volume loss, microvascular disease, basal cisterns remain patent. Redemonstration left frontotemporal scalp soft tissue swelling with interval subcutaneous emphysema (3:37). If symptoms persist consider follow-up head CT or MRI if no contraindications.      EXAM:  CT BRAIN                        PROCEDURE DATE:  08/11/2021    IMPRESSION:  Left temporal and parietal convexity subdural hematoma with new component of subdural hematoma over the left parietal convexity. Maximum thickness of the subdural hematoma or the left parietal convexity 7 mm. No significant mass effect or shift of the midline.      Chart documentations:      8/10/21 Consult Note Adult-Neurosurgery Physician Assistant:  HPI: Patient is an 88 y/o F PMH HTN transferred from [facility] s/p trip & fall this morning. Pt. states she felt dizzy this morning and then fell from standing down 4 stairs. Denies LOC. Pt. states she called her daughter after falling who called EMS. CTH at [facility] reveals Left frontotemporal 6.7 mm extra-axial hemorrhagic collection likely subdural with exophytic temporal component small epidural not excluded, and 5 mm left posterior frontoparietal possible tiny intraparenchymal hemorrhagic contusion and subarachnoid hemorrhage, 2 mm rightward shift.      8/10/21 Consult Note Adult-NSICU Physician Assistant/Attending:  • Assessment	  Assessment:   88 yo F with PMH of HTN who presents today after dizziness causing fall down 4 stairs with hit to head, no LOC. Patient found to have L SDH measuring 6.7 mm with 2 mm midline shift.      8/11/21 Progress Note Adult-Internal Medicine Attending:  Assessment and Plan:   • Assessment	  Patient is an 88 yo F with PMH of HTN who presents from [facility] with traumatic SDH. Patient felt dizzy and fell without LOC. Patient was taken to [facility], found to have a small SDH with midline shift.

## 2021-08-25 ENCOUNTER — NON-APPOINTMENT (OUTPATIENT)
Age: 86
End: 2021-08-25

## 2021-08-25 ENCOUNTER — APPOINTMENT (OUTPATIENT)
Dept: NEUROSURGERY | Facility: CLINIC | Age: 86
End: 2021-08-25
Payer: MEDICARE

## 2021-08-25 VITALS
TEMPERATURE: 98.2 F | OXYGEN SATURATION: 98 % | WEIGHT: 135 LBS | DIASTOLIC BLOOD PRESSURE: 83 MMHG | BODY MASS INDEX: 26.5 KG/M2 | HEIGHT: 60 IN | SYSTOLIC BLOOD PRESSURE: 171 MMHG | HEART RATE: 79 BPM

## 2021-08-25 DIAGNOSIS — Z78.9 OTHER SPECIFIED HEALTH STATUS: ICD-10-CM

## 2021-08-25 PROCEDURE — 99213 OFFICE O/P EST LOW 20 MIN: CPT

## 2021-08-25 RX ORDER — AMLODIPINE BESYLATE 2.5 MG/1
2.5 TABLET ORAL
Refills: 0 | Status: ACTIVE | COMMUNITY

## 2021-08-25 RX ORDER — NITROFURANTOIN MACROCRYSTAL 50 MG
50 CAPSULE ORAL
Refills: 0 | Status: ACTIVE | COMMUNITY

## 2021-08-25 RX ORDER — RALOXIFENE HYDROCHLORIDE 60 MG/1
60 TABLET, FILM COATED ORAL
Refills: 0 | Status: ACTIVE | COMMUNITY

## 2021-08-25 NOTE — HISTORY OF PRESENT ILLNESS
[de-identified] : Hospital Course \par Discharge Date	12-Aug-2021 \par Admission Date	10-Aug-2021 14:29 \par Reason for Admission	Trauma, fall.  Imaging at [facility] [place] revealed Left \par frontal/temporal SDH.  No LOC \par Hospital Course	 \par Patient is an 88 yo F with PMH of HTN who presents from [facility] with \par traumatic SDH. Patient felt dizzy and fell without LOC. Patient was taken to \par [facility], found to have a small SDH with midline shift. Patient admitted to NSICU \par and had ct head: \par \par , no change left frontotemporal 6.7 mm subdural hemorrhage, left frontal \par parietal 5 mm possible subarachnoid or intraparenchymal hemorrhagic contusion, \par 2 mm rightward shift, volume loss, microvascular disease, basal cisterns remain \par patent. Redemonstration left frontotemporal scalp soft tissue swelling with \par interval subcutaneous emphysema (3:37\par \par At today's visit, she admits to mild left frontal headaches, no nausea or vomiting.  Denies any vision changes.  No seizures and has completed prophylactic course of Keppra 500 mg BID x 5days.   Denies any numbness/tingling or weakness of extremities.   She is ambulating independently and working with physical therapy at home.  No speech dysfluency.  She has scabbing along the left frontal scalp and left lower extremity at ankle.  No evidence of infection.  She complaints of left thumb pain secondary to small fracture.  She is able to move and use her hand.

## 2021-08-25 NOTE — ASSESSMENT
[FreeTextEntry1] : Ms. Cormier presents with above history and imaging She is neurologically intact.   Given she has complaints of left sided headaches, she will obtain a repeat CT head w/o contrast to assess interval resolution of L SDH.\par Plan:\par CT head w/o contrast to assess ICH\par Continue BP medications as ordered to maintained BP <140/90. \par Maintain fall precautions.\par Continue physical therapy as directed.\par Follow up after imaging via telephone.\par Patient has been given an opportunity to ask and have their questions answered to their satisfaction.\par Patient knows to call the office if there are any new or worsening symptoms.\par \par

## 2021-08-25 NOTE — PHYSICAL EXAM
[General Appearance - Alert] : alert [General Appearance - In No Acute Distress] : in no acute distress [General Appearance - Well Nourished] : well nourished [General Appearance - Well Developed] : well developed [Oriented To Time, Place, And Person] : oriented to person, place, and time [Impaired Insight] : insight and judgment were intact [Affect] : the affect was normal [Person] : oriented to person [Place] : oriented to place [Time] : oriented to time [Short Term Intact] : short term memory intact [Fluency] : fluency intact [Comprehension] : comprehension intact [Cranial Nerves Optic (II)] : visual acuity intact bilaterally,  pupils equal round and reactive to light [Cranial Nerves Oculomotor (III)] : extraocular motion intact [Cranial Nerves Trigeminal (V)] : facial sensation intact symmetrically [Cranial Nerves Facial (VII)] : face symmetrical [Cranial Nerves Glossopharyngeal (IX)] : tongue and palate midline [Cranial Nerves Accessory (XI - Cranial And Spinal)] : head turning and shoulder shrug symmetric [Cranial Nerves Hypoglossal (XII)] : there was no tongue deviation with protrusion [Motor Tone] : muscle tone was normal in all four extremities [Motor Strength] : muscle strength was normal in all four extremities [Sensation Tactile Decrease] : light touch was intact [Sensation Pain / Temperature Decrease] : pain and temperature was intact [Abnormal Walk] : normal gait [Balance] : balance was intact [No Visual Abnormalities] : no visible abnormailities [Normal] : normal [Over the Past 2 Weeks, Have You Felt Down, Depressed, or Hopeless?] : 1.) Over the past 2 weeks, have you felt down, depressed, or hopeless? No [Over the Past 2 Weeks, Have You Felt Little Interest or Pleasure Doing Things?] : 2.) Over the past 2 weeks, have you felt little interest or pleasure doing things? No

## 2021-08-25 NOTE — DATA REVIEWED
[de-identified] : \par  EXAM: CT BRAIN\par \par PROCEDURE DATE: 08/12/2021\par \par \par \par INTERPRETATION: CLINICAL STATEMENT: Follow-up bleed\par \par TECHNIQUE: CT of the head was performed without IV contrast. RAPID artificial intelligence was utilized for the preliminary evaluation of intracranial hemorrhage.\par \par COMPARISON: CT head 8/11/2021\par \par FINDINGS:\par There is mild diffuse parenchymal volume loss. There are areas of low attenuation in the periventricular white matter likely related to moderate chronic microvascular ischemic changes.\par \par Small left acute left subdural hematoma without significant change in the left temporal region but slightly smaller in the left parietal region on the coronal plane measuring 6 mm in maximum width, previously measuring 7 mm.\par \par There is no midline shift. There is no acute territorial infarct. There is no hydrocephalus.\par \par The cranium is intact. Left frontal soft tissue swelling/hematoma. The visualized paranasal sinuses are well-aerated.\par \par IMPRESSION:\par Left subdural hematoma as described above\par

## 2021-08-31 ENCOUNTER — EMERGENCY (EMERGENCY)
Facility: HOSPITAL | Age: 86
LOS: 1 days | End: 2021-08-31
Admitting: EMERGENCY MEDICINE

## 2021-08-31 ENCOUNTER — APPOINTMENT (OUTPATIENT)
Dept: CT IMAGING | Facility: CLINIC | Age: 86
End: 2021-08-31
Payer: MEDICARE

## 2021-08-31 DIAGNOSIS — Z90.5 ACQUIRED ABSENCE OF KIDNEY: Chronic | ICD-10-CM

## 2021-08-31 DIAGNOSIS — Z90.49 ACQUIRED ABSENCE OF OTHER SPECIFIED PARTS OF DIGESTIVE TRACT: Chronic | ICD-10-CM

## 2021-08-31 PROCEDURE — 70450 CT HEAD/BRAIN W/O DYE: CPT | Mod: MH

## 2021-09-01 ENCOUNTER — INPATIENT (INPATIENT)
Facility: HOSPITAL | Age: 86
LOS: 1 days | Discharge: HOME | DRG: 42 | End: 2021-09-03
Attending: HOSPITALIST | Admitting: NEUROLOGICAL SURGERY
Payer: MEDICARE

## 2021-09-01 VITALS
SYSTOLIC BLOOD PRESSURE: 174 MMHG | RESPIRATION RATE: 18 BRPM | HEART RATE: 75 BPM | DIASTOLIC BLOOD PRESSURE: 73 MMHG | HEIGHT: 60 IN | OXYGEN SATURATION: 99 % | WEIGHT: 169.98 LBS

## 2021-09-01 DIAGNOSIS — Z90.5 ACQUIRED ABSENCE OF KIDNEY: Chronic | ICD-10-CM

## 2021-09-01 DIAGNOSIS — Z90.49 ACQUIRED ABSENCE OF OTHER SPECIFIED PARTS OF DIGESTIVE TRACT: Chronic | ICD-10-CM

## 2021-09-01 DIAGNOSIS — Z98.49 CATARACT EXTRACTION STATUS, UNSPECIFIED EYE: Chronic | ICD-10-CM

## 2021-09-01 DIAGNOSIS — S06.5X9A TRAUMATIC SUBDURAL HEMORRHAGE WITH LOSS OF CONSCIOUSNESS OF UNSPECIFIED DURATION, INITIAL ENCOUNTER: ICD-10-CM

## 2021-09-01 LAB
ALBUMIN SERPL ELPH-MCNC: 4.3 G/DL — SIGNIFICANT CHANGE UP (ref 3.3–5.2)
ALP SERPL-CCNC: 70 U/L — SIGNIFICANT CHANGE UP (ref 40–120)
ALT FLD-CCNC: 8 U/L — SIGNIFICANT CHANGE UP
ANION GAP SERPL CALC-SCNC: 15 MMOL/L — SIGNIFICANT CHANGE UP (ref 5–17)
APTT BLD: 36.6 SEC — HIGH (ref 27.5–35.5)
AST SERPL-CCNC: 14 U/L — SIGNIFICANT CHANGE UP
BASOPHILS # BLD AUTO: 0.04 K/UL — SIGNIFICANT CHANGE UP (ref 0–0.2)
BASOPHILS NFR BLD AUTO: 0.6 % — SIGNIFICANT CHANGE UP (ref 0–2)
BILIRUB SERPL-MCNC: 0.3 MG/DL — LOW (ref 0.4–2)
BLD GP AB SCN SERPL QL: SIGNIFICANT CHANGE UP
BUN SERPL-MCNC: 12.5 MG/DL — SIGNIFICANT CHANGE UP (ref 8–20)
CALCIUM SERPL-MCNC: 9 MG/DL — SIGNIFICANT CHANGE UP (ref 8.6–10.2)
CHLORIDE SERPL-SCNC: 105 MMOL/L — SIGNIFICANT CHANGE UP (ref 98–107)
CO2 SERPL-SCNC: 23 MMOL/L — SIGNIFICANT CHANGE UP (ref 22–29)
CREAT SERPL-MCNC: 0.83 MG/DL — SIGNIFICANT CHANGE UP (ref 0.5–1.3)
EOSINOPHIL # BLD AUTO: 0.07 K/UL — SIGNIFICANT CHANGE UP (ref 0–0.5)
EOSINOPHIL NFR BLD AUTO: 1.1 % — SIGNIFICANT CHANGE UP (ref 0–6)
GLUCOSE SERPL-MCNC: 148 MG/DL — HIGH (ref 70–99)
HCT VFR BLD CALC: 40.4 % — SIGNIFICANT CHANGE UP (ref 34.5–45)
HGB BLD-MCNC: 12.8 G/DL — SIGNIFICANT CHANGE UP (ref 11.5–15.5)
IMM GRANULOCYTES NFR BLD AUTO: 0.3 % — SIGNIFICANT CHANGE UP (ref 0–1.5)
INR BLD: 1.01 RATIO — SIGNIFICANT CHANGE UP (ref 0.88–1.16)
LYMPHOCYTES # BLD AUTO: 1.27 K/UL — SIGNIFICANT CHANGE UP (ref 1–3.3)
LYMPHOCYTES # BLD AUTO: 20.1 % — SIGNIFICANT CHANGE UP (ref 13–44)
MCHC RBC-ENTMCNC: 29.5 PG — SIGNIFICANT CHANGE UP (ref 27–34)
MCHC RBC-ENTMCNC: 31.7 GM/DL — LOW (ref 32–36)
MCV RBC AUTO: 93.1 FL — SIGNIFICANT CHANGE UP (ref 80–100)
MONOCYTES # BLD AUTO: 0.35 K/UL — SIGNIFICANT CHANGE UP (ref 0–0.9)
MONOCYTES NFR BLD AUTO: 5.5 % — SIGNIFICANT CHANGE UP (ref 2–14)
NEUTROPHILS # BLD AUTO: 4.56 K/UL — SIGNIFICANT CHANGE UP (ref 1.8–7.4)
NEUTROPHILS NFR BLD AUTO: 72.4 % — SIGNIFICANT CHANGE UP (ref 43–77)
PLATELET # BLD AUTO: 187 K/UL — SIGNIFICANT CHANGE UP (ref 150–400)
POTASSIUM SERPL-MCNC: 4 MMOL/L — SIGNIFICANT CHANGE UP (ref 3.5–5.3)
POTASSIUM SERPL-SCNC: 4 MMOL/L — SIGNIFICANT CHANGE UP (ref 3.5–5.3)
PROT SERPL-MCNC: 6.9 G/DL — SIGNIFICANT CHANGE UP (ref 6.6–8.7)
PROTHROM AB SERPL-ACNC: 11.7 SEC — SIGNIFICANT CHANGE UP (ref 10.6–13.6)
RBC # BLD: 4.34 M/UL — SIGNIFICANT CHANGE UP (ref 3.8–5.2)
RBC # FLD: 14.4 % — SIGNIFICANT CHANGE UP (ref 10.3–14.5)
SARS-COV-2 RNA SPEC QL NAA+PROBE: SIGNIFICANT CHANGE UP
SODIUM SERPL-SCNC: 143 MMOL/L — SIGNIFICANT CHANGE UP (ref 135–145)
WBC # BLD: 6.31 K/UL — SIGNIFICANT CHANGE UP (ref 3.8–10.5)
WBC # FLD AUTO: 6.31 K/UL — SIGNIFICANT CHANGE UP (ref 3.8–10.5)

## 2021-09-01 PROCEDURE — 99223 1ST HOSP IP/OBS HIGH 75: CPT

## 2021-09-01 PROCEDURE — 99291 CRITICAL CARE FIRST HOUR: CPT

## 2021-09-01 PROCEDURE — 93010 ELECTROCARDIOGRAM REPORT: CPT

## 2021-09-01 PROCEDURE — 99233 SBSQ HOSP IP/OBS HIGH 50: CPT

## 2021-09-01 PROCEDURE — 99285 EMERGENCY DEPT VISIT HI MDM: CPT

## 2021-09-01 RX ORDER — SODIUM CHLORIDE 9 MG/ML
1000 INJECTION, SOLUTION INTRAVENOUS
Refills: 0 | Status: DISCONTINUED | OUTPATIENT
Start: 2021-09-01 | End: 2021-09-01

## 2021-09-01 RX ORDER — RALOXIFENE HYDROCHLORIDE 60 MG/1
60 TABLET, COATED ORAL DAILY
Refills: 0 | Status: DISCONTINUED | OUTPATIENT
Start: 2021-09-01 | End: 2021-09-03

## 2021-09-01 RX ORDER — HYDRALAZINE HCL 50 MG
10 TABLET ORAL ONCE
Refills: 0 | Status: COMPLETED | OUTPATIENT
Start: 2021-09-01 | End: 2021-09-01

## 2021-09-01 RX ORDER — SODIUM CHLORIDE 9 MG/ML
1000 INJECTION, SOLUTION INTRAVENOUS
Refills: 0 | Status: COMPLETED | OUTPATIENT
Start: 2021-09-01 | End: 2021-09-02

## 2021-09-01 RX ORDER — ATORVASTATIN CALCIUM 80 MG/1
40 TABLET, FILM COATED ORAL AT BEDTIME
Refills: 0 | Status: DISCONTINUED | OUTPATIENT
Start: 2021-09-01 | End: 2021-09-03

## 2021-09-01 RX ORDER — LOSARTAN POTASSIUM 100 MG/1
50 TABLET, FILM COATED ORAL DAILY
Refills: 0 | Status: COMPLETED | OUTPATIENT
Start: 2021-09-01 | End: 2021-09-01

## 2021-09-01 RX ORDER — NICARDIPINE HYDROCHLORIDE 30 MG/1
5 CAPSULE, EXTENDED RELEASE ORAL
Qty: 40 | Refills: 0 | Status: DISCONTINUED | OUTPATIENT
Start: 2021-09-01 | End: 2021-09-01

## 2021-09-01 RX ORDER — PANTOPRAZOLE SODIUM 20 MG/1
40 TABLET, DELAYED RELEASE ORAL
Refills: 0 | Status: DISCONTINUED | OUTPATIENT
Start: 2021-09-01 | End: 2021-09-03

## 2021-09-01 RX ORDER — HYDRALAZINE HCL 50 MG
10 TABLET ORAL
Refills: 0 | Status: DISCONTINUED | OUTPATIENT
Start: 2021-09-01 | End: 2021-09-03

## 2021-09-01 RX ORDER — NITROFURANTOIN MACROCRYSTAL 50 MG
100 CAPSULE ORAL ONCE
Refills: 0 | Status: DISCONTINUED | OUTPATIENT
Start: 2021-09-01 | End: 2021-09-01

## 2021-09-01 RX ORDER — AMLODIPINE BESYLATE 2.5 MG/1
2.5 TABLET ORAL DAILY
Refills: 0 | Status: DISCONTINUED | OUTPATIENT
Start: 2021-09-01 | End: 2021-09-01

## 2021-09-01 RX ORDER — NITROFURANTOIN MACROCRYSTAL 50 MG
50 CAPSULE ORAL
Refills: 0 | Status: DISCONTINUED | OUTPATIENT
Start: 2021-09-01 | End: 2021-09-01

## 2021-09-01 RX ORDER — DILTIAZEM HCL 120 MG
10 CAPSULE, EXT RELEASE 24 HR ORAL ONCE
Refills: 0 | Status: COMPLETED | OUTPATIENT
Start: 2021-09-01 | End: 2021-09-01

## 2021-09-01 RX ORDER — ACETAMINOPHEN 500 MG
1000 TABLET ORAL ONCE
Refills: 0 | Status: COMPLETED | OUTPATIENT
Start: 2021-09-01 | End: 2021-09-01

## 2021-09-01 RX ORDER — LANOLIN ALCOHOL/MO/W.PET/CERES
3 CREAM (GRAM) TOPICAL ONCE
Refills: 0 | Status: COMPLETED | OUTPATIENT
Start: 2021-09-01 | End: 2021-09-01

## 2021-09-01 RX ORDER — METOPROLOL TARTRATE 50 MG
25 TABLET ORAL EVERY 6 HOURS
Refills: 0 | Status: DISCONTINUED | OUTPATIENT
Start: 2021-09-01 | End: 2021-09-03

## 2021-09-01 RX ORDER — SENNA PLUS 8.6 MG/1
1 TABLET ORAL DAILY
Refills: 0 | Status: DISCONTINUED | OUTPATIENT
Start: 2021-09-01 | End: 2021-09-03

## 2021-09-01 RX ADMIN — Medication 1000 MILLIGRAM(S): at 11:28

## 2021-09-01 RX ADMIN — ATORVASTATIN CALCIUM 40 MILLIGRAM(S): 80 TABLET, FILM COATED ORAL at 23:03

## 2021-09-01 RX ADMIN — Medication 3 MILLIGRAM(S): at 23:03

## 2021-09-01 RX ADMIN — Medication 400 MILLIGRAM(S): at 04:24

## 2021-09-01 RX ADMIN — SODIUM CHLORIDE 75 MILLILITER(S): 9 INJECTION, SOLUTION INTRAVENOUS at 23:04

## 2021-09-01 RX ADMIN — Medication 400 MILLIGRAM(S): at 09:19

## 2021-09-01 RX ADMIN — LOSARTAN POTASSIUM 50 MILLIGRAM(S): 100 TABLET, FILM COATED ORAL at 17:57

## 2021-09-01 RX ADMIN — Medication 25 MILLIGRAM(S): at 23:04

## 2021-09-01 RX ADMIN — SODIUM CHLORIDE 75 MILLILITER(S): 9 INJECTION, SOLUTION INTRAVENOUS at 11:31

## 2021-09-01 RX ADMIN — Medication 10 MILLIGRAM(S): at 02:10

## 2021-09-01 RX ADMIN — Medication 25 MILLIGRAM(S): at 13:22

## 2021-09-01 RX ADMIN — PANTOPRAZOLE SODIUM 40 MILLIGRAM(S): 20 TABLET, DELAYED RELEASE ORAL at 09:43

## 2021-09-01 RX ADMIN — Medication 10 MILLIGRAM(S): at 11:07

## 2021-09-01 RX ADMIN — Medication 25 MILLIGRAM(S): at 17:57

## 2021-09-01 RX ADMIN — Medication 10 MILLIGRAM(S): at 13:22

## 2021-09-01 RX ADMIN — Medication 50 MILLIGRAM(S): at 09:20

## 2021-09-01 RX ADMIN — RALOXIFENE HYDROCHLORIDE 60 MILLIGRAM(S): 60 TABLET, COATED ORAL at 11:11

## 2021-09-01 RX ADMIN — AMLODIPINE BESYLATE 2.5 MILLIGRAM(S): 2.5 TABLET ORAL at 06:40

## 2021-09-01 RX ADMIN — Medication 10 MILLIGRAM(S): at 03:10

## 2021-09-01 RX ADMIN — SODIUM CHLORIDE 60 MILLILITER(S): 9 INJECTION, SOLUTION INTRAVENOUS at 04:23

## 2021-09-01 NOTE — CHART NOTE - NSCHARTNOTEFT_GEN_A_CORE
CC: dizziness/ headache    [place] from [facility] [place]       Overnight/ AM events:  Patient seen and examined at bedside. No acute events overnight. Daughter Ruthie at bedside. Patient states she is feeling better, headache intermittently recurs, frontal pressure like in character, 7/10. Associated intermittent photophobia at times. Pt reported that she was laying in bed and got up to try and go to the bathroom and she began feeling dizzy, and the sensation of feeling as if she was going to pass out. Pt reports she has been intermittently feeling this way on and off, usually when moving from positions. Aside from this the pt denies any sx of sob, chest pain or prior episodes of LOC. Patient denies visual disturbances, motor deficits, abd pain, N/V, fever, chills, dysuria or any other complaints. All remainder ROS negative.         Vital Signs Last 24 Hrs  T(C): --  T(F): --  HR: 91 (01 Sep 2021 10:00) (69 - 91)  BP: 156/81 (01 Sep 2021 10:00) (130/69 - 196/91)  BP(mean): 112 (01 Sep 2021 10:00) (100 - 112)  RR: 19 (01 Sep 2021 10:00) (17 - 19)  SpO2: 97% (01 Sep 2021 10:00) (96% - 99%)          CONSTITUTIONAL: Well appearing, well nourished, awake, alert and in no apparent distress  ENMT: Head +ve left frontal healed wound  - Airway patent, Nasal mucosa clear. Mouth with normal mucosa.    EYES: Clear bilaterally, pupils equal, round and reactive to light. EOMI.  CARDIAC: Normal rate, regular rhythm.  Heart sounds S1, S2.  No murmurs, rubs or gallops   RESPIRATORY: Breath sounds clear and equal bilaterally. No wheezes, rhales or rhonchi  GASTROENTEROLOGY: Soft nt nd bs +normoactive   MUSCULOSKELETAL: Spine appears normal, range of motion is not limited Strength wnl   EXTREMITIES: No edema, cyanosis or deformity   NEUROLOGICAL: Alert and oriented, no focal deficits, no motor or sensory deficits.  SKIN: poor skin turgor, b/l knees with healed abrasions         A/P: 88 y.o. F with hx HTN and osteoporosis, recently discharged from [facility] on 8/12 after a mechanical fall 8/10/21, resulting in a left sided subdural hematoma. Patient was discharged from [facility] to home with outpatient follow up. Post discharge pt reported recurrent headaches controlled on tylenol, she followed up outpt neurology and had repeat ct head on 8/31 for non contrasted CTH study which showed an increase in the accumulation of fluid since her previous scan on 8/12. Pt was sent to [facility] due to the CT findings coupled with the consistent headache and an episode of near syncope.  A repeat scan was performed at [facility] and showed a stable subdural with no changes from previous outpatient study. She was then flown into [facility] via [employer] as a code trauma B which was cancelled upon arrival. Neurosurgery and NSICU were consulted and patient was admitted to the NSICU for observation overnight. HTN urgency noted on admission with SBP on arrival which was controlled with IV and po bp meds. Neurologically pt remained stable. Neuro ICU downgraded the pt to the medicine team to further manage the pre syncope.     Admit to telemetry        Subdural     Presyncope     - [facility] cardiology consulted     DVT ppx  -c/w scd boots b/l     Activity level: Increase as tolerated    Dispo: Pt remains acute requiring further work up for pre syncope. PT/OT consults pending. CC: dizziness/ headache    Transfer from [facility] for SDH       Overnight/ AM events:  Patient seen and examined at bedside. No acute events overnight. Daughter Ruthie at bedside. Patient states she is feeling better, headache intermittently recurs, frontal pressure like in character, 7/10. Associated intermittent photophobia at times. Pt reported that she was laying in bed and got up to try and go to the bathroom and she began feeling dizzy, and the sensation of feeling as if she was going to pass out. Pt reports she has been intermittently feeling this way on and off, usually when moving from positions. Aside from this the pt denies any sx of sob, chest pain or prior episodes of LOC. Patient denies visual disturbances, motor deficits, abd pain, N/V, fever, chills, dysuria or any other complaints. All remainder ROS negative.         Vital Signs Last 24 Hrs  T(C): --  T(F): --  HR: 91 (01 Sep 2021 10:00) (69 - 91)  BP: 156/81 (01 Sep 2021 10:00) (130/69 - 196/91)  BP(mean): 112 (01 Sep 2021 10:00) (100 - 112)  RR: 19 (01 Sep 2021 10:00) (17 - 19)  SpO2: 97% (01 Sep 2021 10:00) (96% - 99%)        CONSTITUTIONAL: Well appearing, well nourished, awake, alert and in no apparent distress  ENMT: Head +ve left frontal healed wound  - Airway patent, Nasal mucosa clear. Mouth with normal mucosa.    EYES: Clear bilaterally, pupils equal, round and reactive to light. EOMI.  CARDIAC: Normal rate, regular rhythm.  Heart sounds S1, S2.  No murmurs, rubs or gallops   RESPIRATORY: Breath sounds clear and equal bilaterally. No wheezes, rhales or rhonchi  GASTROENTEROLOGY: Soft nt nd bs +normoactive   MUSCULOSKELETAL: Spine appears normal, range of motion is not limited Strength wnl   EXTREMITIES: No edema, cyanosis or deformity   NEUROLOGICAL: Alert and oriented, no focal deficits, no motor or sensory deficits.  SKIN: poor skin turgor, b/l knees with healed abrasions         A/P: 88 y.o. F with hx HTN and osteoporosis, recently discharged from [facility] on 8/12 after a mechanical fall 8/10/21, resulting in a left sided subdural hematoma. Patient was discharged from [facility] to home with outpatient follow up. Post discharge pt reported recurrent headaches controlled on tylenol, she followed up outpt neurology and had repeat ct head on 8/31 for non contrasted CTH study which showed an increase in the accumulation of fluid since her previous scan on 8/12. Pt was sent to [facility] due to the CT findings coupled with the consistent headache and an episode of near syncope.  A repeat scan was performed at [facility] and showed a stable subdural with no changes from previous outpatient study. She was then flown into [facility] via [employer] as a code trauma B which was cancelled upon arrival. Neurosurgery and NSICU were consulted and patient was admitted to the NSICU for observation overnight. HTN urgency noted on admission with SBP on arrival which was controlled with IV and po bp meds. Neurologically pt remained stable. Neuro ICU downgraded the pt to the medicine team to further manage the pre syncope.     Admit to telemetry        Subdural      Presyncope   - prior 2 episodes similar, as per pt on prior hospitalization was syncope not mechanical fall   - stat orthostatics and will perform daily   - pt advised to pay close attention to her hydration/ nutrition   - recommended by cardio for ILR in the am  - cardio discussing with the pt and daughter in regards to the procedure   - tentatively planned for tomorrow  - npo at midnight   - [facility] cardiology consulted     DVT ppx  -c/w scd boots b/l     Activity level: Increase as tolerated    Dispo: Pt remains acute requiring further work up for pre syncope. PT/OT consults pending. CC: dizziness/ headache    Transfer from [facility] for SDH       Overnight/ AM events:  Patient seen and examined at bedside. No acute events overnight. Daughter Ruthie at bedside. Patient states she is feeling better, headache intermittently recurs, frontal pressure like in character, 7/10. Associated intermittent photophobia at times. Pt reported that she was laying in bed and got up to try and go to the bathroom and she began feeling dizzy, and the sensation of feeling as if she was going to pass out. Pt reports she has been intermittently feeling this way on and off, usually when moving from positions. Aside from this the pt denies any sx of sob, chest pain or prior episodes of LOC. Patient denies visual disturbances, motor deficits, abd pain, N/V, fever, chills, dysuria or any other complaints. All remainder ROS negative.         Vital Signs Last 24 Hrs  T(C): --  T(F): --  HR: 91 (01 Sep 2021 10:00) (69 - 91)  BP: 156/81 (01 Sep 2021 10:00) (130/69 - 196/91)  BP(mean): 112 (01 Sep 2021 10:00) (100 - 112)  RR: 19 (01 Sep 2021 10:00) (17 - 19)  SpO2: 97% (01 Sep 2021 10:00) (96% - 99%)        CONSTITUTIONAL: Well appearing, well nourished, awake, alert and in no apparent distress  ENMT: Head +ve left frontal healed wound  - Airway patent, Nasal mucosa clear. Mouth with normal mucosa.    EYES: Clear bilaterally, pupils equal, round and reactive to light. EOMI.  CARDIAC: Normal rate, regular rhythm.  Heart sounds S1, S2.  No murmurs, rubs or gallops   RESPIRATORY: Breath sounds clear and equal bilaterally. No wheezes, rhales or rhonchi  GASTROENTEROLOGY: Soft nt nd bs +normoactive   MUSCULOSKELETAL: Spine appears normal, range of motion is not limited Strength wnl   EXTREMITIES: No edema, cyanosis or deformity   NEUROLOGICAL: Alert and oriented, no focal deficits, no motor or sensory deficits.  SKIN: poor skin turgor, b/l knees with healed abrasions         A/P: 88 y.o. F with hx HTN and osteoporosis, recently discharged from [facility] on 8/12 after a mechanical fall 8/10/21, resulting in a left sided subdural hematoma. Patient was discharged from [facility] to home with outpatient follow up. Post discharge pt reported recurrent headaches controlled on tylenol, she followed up outpt neurology and had repeat ct head on 8/31 for non contrasted CTH study which showed an increase in the accumulation of fluid since her previous scan on 8/12. Pt was sent to [facility] due to the CT findings coupled with the consistent headache and an episode of near syncope.  A repeat scan was performed at [facility] and showed a stable subdural with no changes from previous outpatient study. She was then flown into [facility] via [employer] as a code trauma B which was cancelled upon arrival. Neurosurgery and NSICU were consulted and patient was admitted to the NSICU for observation overnight. HTN urgency noted on admission with SBP on arrival which was controlled with IV and po bp meds. Neurologically pt remained stable. Neuro ICU downgraded the pt to the medicine team to further manage the pre syncope.     Admit to telemetry        Left frontoparietal subdural hematoma  - pt with intermittent recurring headache well controlled on tylenol   - repeat ct head from [facility] stable   - per neuro ICU no repeat ct head needed   -c/w neurochecks q4hrs x 24 hrs then will reassess  - recommend STAT CTH for any decline in mental status  -SBP goal 100-160  - c/w tylenol prn   - neurosx/ neuro ICU transfer note appreciated  - PT/OT consulted     Presyncope   - prior 2 episodes similar, as per pt on prior hospitalization was syncope not mechanical fall   - stat orthostatics and will perform daily orthostatics    - suspected that pt may have degree of orthostatic changes as pt has had decreased po intake and decreased fluid intake   - EKG NSR @ 74bpm  non specific st changes   - c/w tele   - pt advised to pay close attention to her hydration/ nutrition   - will c/w IVF x 24 hrs   - recommended by cardio for ILR in the am  - cardio discussing with the pt and daughter in regards to the procedure   - tentatively planned for tomorrow  - npo at midnight   - [facility] cardiology consulted and d/w Dr. West the above plan of care     HTN urgency  - sbp initially 190 range  - now down trended to 140-150 range  - will perform orthostatics   -d/c norvasc and recommended to start losartan 50mg po qd in the am   - if bp is elevated tonight will start tonight and after orthostatics completed     HLD  -c/w atorvastatin po qhs     DVT ppx  -c/w scd boots b/l     Activity level: Increase as tolerated    Dispo: Pt remains acute requiring further work up for pre syncope. PT/OT consults pending. CC: dizziness/ headache    Transfer from [facility] for SDH       Overnight/ AM events:  Patient seen and examined at bedside. No acute events overnight. Daughter Ruthie at bedside. Patient states she is feeling better, headache intermittently recurs, frontal pressure like in character, 7/10. Associated intermittent photophobia at times. Pt reported that she was laying in bed and got up to try and go to the bathroom and she began feeling dizzy, and the sensation of feeling as if she was going to pass out. Pt reports she has been intermittently feeling this way on and off, usually when moving from positions. Aside from this the pt denies any sx of sob, chest pain or prior episodes of LOC. Patient denies visual disturbances, motor deficits, abd pain, N/V, fever, chills, dysuria or any other complaints. All remainder ROS negative.         Vital Signs Last 24 Hrs  T(C): --  T(F): --  HR: 91 (01 Sep 2021 10:00) (69 - 91)  BP: 156/81 (01 Sep 2021 10:00) (130/69 - 196/91)  BP(mean): 112 (01 Sep 2021 10:00) (100 - 112)  RR: 19 (01 Sep 2021 10:00) (17 - 19)  SpO2: 97% (01 Sep 2021 10:00) (96% - 99%)        CONSTITUTIONAL: Well appearing, well nourished, awake, alert and in no apparent distress  ENMT: Head +ve left frontal healed wound  - Airway patent, Nasal mucosa clear. Mouth with normal mucosa.    EYES: Clear bilaterally, pupils equal, round and reactive to light. EOMI.  CARDIAC: Normal rate, regular rhythm.  Heart sounds S1, S2.  No murmurs, rubs or gallops   RESPIRATORY: Breath sounds clear and equal bilaterally. No wheezes, rhales or rhonchi  GASTROENTEROLOGY: Soft nt nd bs +normoactive   MUSCULOSKELETAL: Spine appears normal, range of motion is not limited Strength wnl   EXTREMITIES: No edema, cyanosis or deformity   NEUROLOGICAL: Alert and oriented, no focal deficits, no motor or sensory deficits.  SKIN: poor skin turgor, b/l knees with healed abrasions         A/P: 88 y.o. F with hx HTN and osteoporosis, recently discharged from [facility] on 8/12 after a mechanical fall 8/10/21, resulting in a left sided subdural hematoma. Patient was discharged from [facility] to home with outpatient follow up. Post discharge pt reported recurrent headaches controlled on tylenol, she followed up outpt neurology and had repeat ct head on 8/31 for non contrasted CTH study which showed an increase in the accumulation of fluid since her previous scan on 8/12. Pt was sent to [facility] due to the CT findings coupled with the consistent headache and an episode of near syncope.  A repeat scan was performed at [facility] and showed a stable subdural with no changes from previous outpatient study. She was then flown into [facility] via [employer] as a code trauma B which was cancelled upon arrival. Neurosurgery and NSICU were consulted and patient was admitted to the NSICU for observation overnight. HTN urgency noted on admission with SBP on arrival which was controlled with IV and po bp meds. Neurologically pt remained stable. Neuro ICU downgraded the pt to the medicine team to further manage the pre syncope.     Admit to telemetry        Left frontoparietal subdural hematoma  - pt with intermittent recurring headache well controlled on tylenol   - repeat ct head from [facility] stable   - per neuro ICU no repeat ct head needed   -c/w neurochecks q4hrs x 24 hrs then will reassess  - recommend STAT CTH for any decline in mental status  -SBP goal 100-160  - c/w tylenol prn   - neurosx/ neuro ICU transfer note appreciated  - PT/OT consulted     Presyncope   - prior 2 episodes similar, as per pt on prior hospitalization was syncope not mechanical fall   - stat orthostatics and will perform daily orthostatics    - suspected that pt may have degree of orthostatic changes as pt has had decreased po intake and decreased fluid intake   - EKG NSR @ 74bpm  non specific st changes   - c/w tele   - pt advised to pay close attention to her hydration/ nutrition   - will c/w IVF x 24 hrs   - recommended by cardio for ILR in the am  - cardio discussing with the pt and daughter in regards to the procedure   - tentatively planned for tomorrow  - npo at midnight   - [facility] cardiology consulted and d/w Dr. West the above plan of care     HTN urgency  - sbp initially 190 range  - now down trended to 140-150 range  - will perform orthostatics   -d/c norvasc and recommended to start losartan 50mg po qd in the am   - if bp is elevated tonight will start tonight and after orthostatics completed     HLD  -c/w atorvastatin po qhs     Oteoporosis   -c/w evista po qd     Constipation  - pt had bm yest   -c/w senna po qhs     DVT ppx  -c/w scd boots b/l   - no dvt ppx at this time     Activity level: Increase as tolerated    Dispo: Pt remains acute requiring further work up for pre syncope. PT/OT consults pending. Pt at baseline lives alone at home, has her daughters who help her and visit her frequently. Pt is independent in ADLs at baseline. CC: dizziness/ headache    Transfer from [facility]       Overnight/ AM events:  Patient seen and examined at bedside. No acute events overnight. Daughter Ruthie at bedside. Patient states she is feeling better, headache intermittently recurs, frontal pressure like in character, 7/10. Associated intermittent photophobia at times. Pt reported that she was laying in bed and got up to try and go to the bathroom and she began feeling dizzy, and the sensation of feeling as if she was going to pass out. Pt reports she has been intermittently feeling this way on and off, usually when moving from positions. Aside from this the pt denies any sx of sob, chest pain or prior episodes of LOC. Patient denies visual disturbances, motor deficits, abd pain, N/V, fever, chills, dysuria or any other complaints. All remainder ROS negative.         Vital Signs Last 24 Hrs  T(C): --  T(F): --  HR: 91 (01 Sep 2021 10:00) (69 - 91)  BP: 156/81 (01 Sep 2021 10:00) (130/69 - 196/91)  BP(mean): 112 (01 Sep 2021 10:00) (100 - 112)  RR: 19 (01 Sep 2021 10:00) (17 - 19)  SpO2: 97% (01 Sep 2021 10:00) (96% - 99%)        CONSTITUTIONAL: Well appearing, well nourished, awake, alert and in no apparent distress  ENMT: Head +ve left frontal healed wound  - Airway patent, Nasal mucosa clear. Mouth with normal mucosa.    EYES: Clear bilaterally, pupils equal, round and reactive to light. EOMI.  CARDIAC: Normal rate, regular rhythm.  Heart sounds S1, S2.  No murmurs, rubs or gallops   RESPIRATORY: Breath sounds clear and equal bilaterally. No wheezes, rhales or rhonchi  GASTROENTEROLOGY: Soft nt nd bs +normoactive   MUSCULOSKELETAL: Spine appears normal, range of motion is not limited Strength wnl   EXTREMITIES: No edema, cyanosis or deformity   NEUROLOGICAL: Alert and oriented, no focal deficits, no motor or sensory deficits.  SKIN: poor skin turgor, b/l knees with healed abrasions         A/P: 88 y.o. F with hx HTN and osteoporosis, recently discharged from [facility] on 8/12 after a mechanical fall 8/10/21, resulting in a left sided subdural hematoma. Patient was discharged from [facility] to home with outpatient follow up. Post discharge pt reported recurrent headaches controlled on tylenol, she followed up outpt neurology and had repeat ct head on 8/31 for non contrasted CTH study which showed an increase in the accumulation of fluid since her previous scan on 8/12. Pt was sent to [facility] due to the CT findings coupled with the consistent headache and an episode of near syncope.  A repeat scan was performed at [facility] and showed a stable subdural with no changes from previous outpatient study. She was then flown into [facility] via [employer] as a code trauma B which was cancelled upon arrival. Neurosurgery and NSICU were consulted and patient was admitted to the NSICU for observation overnight. HTN urgency noted on admission with SBP on arrival which was controlled with IV and po bp meds. Neurologically pt remained stable. Neuro ICU downgraded the pt to the medicine team to further manage the pre syncope.     Admit to telemetry        Left frontoparietal subdural hematoma  - pt with intermittent recurring headache well controlled on tylenol   - repeat ct head from [facility] stable   - per neuro ICU no repeat ct head needed   -c/w neurochecks q4hrs x 24 hrs then will reassess  - recommend STAT CTH for any decline in mental status  -SBP goal 100-160  - c/w tylenol prn   - neurosx/ neuro ICU transfer note appreciated  - PT/OT consulted     Presyncope   - prior 2 episodes similar, as per pt on prior hospitalization was syncope not mechanical fall   - stat orthostatics and will perform daily orthostatics    - suspected that pt may have degree of orthostatic changes as pt has had decreased po intake and decreased fluid intake   - EKG NSR @ 74bpm  non specific st changes   - c/w tele   - pt advised to pay close attention to her hydration/ nutrition   - will c/w IVF x 24 hrs   - recommended by cardio for ILR in the am  - cardio discussing with the pt and daughter in regards to the procedure   - tentatively planned for tomorrow  - npo at midnight   - [facility] cardiology consulted and d/w Dr. West the above plan of care     HTN urgency  - sbp initially 190 range  - now down trended to 140-150 range  - will perform orthostatics   -d/c norvasc and recommended to start losartan 50mg po qd in the am   - if bp is elevated tonight will start tonight and after orthostatics completed     HLD  -c/w atorvastatin po qhs     Oteoporosis   -c/w evista po qd     Constipation  - pt had bm yest   -c/w senna po qhs     DVT ppx  -c/w scd boots b/l   - no dvt ppx at this time     GI ppc  -c/w ppi po qd     Activity level: Increase as tolerated    Dispo: Pt remains acute requiring further work up for pre syncope. PT/OT consults pending. Pt at baseline lives alone at home, has her daughters who help her and visit her frequently. Pt is independent in ADLs at baseline. CC: dizziness/ headache    Transfer from [facility] for [facility]H       Overnight/ AM events:  Patient seen and examined at bedside. No acute events overnight. Daughter Ruthie at bedside. Patient states she is feeling better, headache intermittently recurs, frontal pressure like in character, 7/10. Associated intermittent photophobia at times. Pt reported that she was laying in bed and got up to try and go to the bathroom and she began feeling dizzy, and the sensation of feeling as if she was going to pass out. Pt reports she has been intermittently feeling this way on and off, usually when moving from positions. Aside from this the pt denies any sx of sob, chest pain or prior episodes of LOC. Patient denies visual disturbances, motor deficits, abd pain, N/V, fever, chills, dysuria or any other complaints. All remainder ROS negative.         Vital Signs Last 24 Hrs  T(C): --  T(F): --  HR: 91 (01 Sep 2021 10:00) (69 - 91)  BP: 156/81 (01 Sep 2021 10:00) (130/69 - 196/91)  BP(mean): 112 (01 Sep 2021 10:00) (100 - 112)  RR: 19 (01 Sep 2021 10:00) (17 - 19)  SpO2: 97% (01 Sep 2021 10:00) (96% - 99%)        CONSTITUTIONAL: Well appearing, well nourished, awake, alert and in no apparent distress  ENMT: Head +ve left frontal healed wound  - Airway patent, Nasal mucosa clear. Mouth with normal mucosa.    EYES: Clear bilaterally, pupils equal, round and reactive to light. EOMI.  CARDIAC: Normal rate, regular rhythm.  Heart sounds S1, S2.  No murmurs, rubs or gallops   RESPIRATORY: Breath sounds clear and equal bilaterally. No wheezes, rhales or rhonchi  GASTROENTEROLOGY: Soft nt nd bs +normoactive   MUSCULOSKELETAL: Spine appears normal, range of motion is not limited Strength wnl   EXTREMITIES: No edema, cyanosis or deformity   NEUROLOGICAL: Alert and oriented, no focal deficits, no motor or sensory deficits.  SKIN: poor skin turgor, b/l knees with healed abrasions         A/P: 88 y.o. F with hx HTN and osteoporosis, recently discharged from [facility] on 8/12 after a mechanical fall 8/10/21, resulting in a left sided subdural hematoma. Patient was discharged from [facility] to home with outpatient follow up. Post discharge pt reported recurrent headaches controlled on tylenol, she followed up outpt neurology and had repeat ct head on 8/31 for non contrasted CTH study which showed an increase in the accumulation of fluid since her previous scan on 8/12. Pt was sent to [facility] due to the CT findings coupled with the consistent headache and an episode of near syncope.  A repeat scan was performed at [facility] and showed a stable subdural with no changes from previous outpatient study. She was then flown into [facility] via [employer] as a code trauma B which was cancelled upon arrival. Neurosurgery and NSICU were consulted and patient was admitted to the NSICU for observation overnight. HTN urgency noted on admission with SBP on arrival which was controlled with IV and po bp meds. Neurologically pt remained stable. Neuro ICU downgraded the pt to the medicine team to further manage the pre syncope.     Admit to telemetry        Left frontoparietal subdural hematoma  - pt with intermittent recurring headache well controlled on tylenol   - repeat ct head from [facility] stable   - per neuro ICU no repeat ct head needed   -c/w neurochecks q4hrs x 24 hrs then will reassess  - recommend STAT CTH for any decline in mental status  -SBP goal 100-160  - c/w tylenol prn   - neurosx/ neuro ICU transfer note appreciated  - PT/OT consulted     Presyncope   - prior 2 episodes similar, as per pt on prior hospitalization was syncope not mechanical fall   - stat orthostatics and will perform daily orthostatics    - suspected that pt may have degree of orthostatic changes as pt has had decreased po intake and decreased fluid intake   - EKG NSR @ 74bpm  non specific st changes   - c/w tele monitoring for arrythmia   - pt advised to pay close attention to her hydration/ nutrition   - will c/w IVF x 24 hrs   - recommended by cardio for ILR in the am  - cardio discussing with the pt and daughter in regards to the procedure   - tentatively planned for tomorrow  - npo at midnight   - [facility] cardiology consulted and d/w Dr. West the above plan of care     HTN urgency  - sbp initially 190 range  - now down trended to 140-150 range  - will perform orthostatics   -d/c norvasc and recommended to start losartan 50mg po qd in the am   - if bp is elevated tonight will start tonight and after orthostatics completed     HLD  -c/w atorvastatin po qhs     Oteoporosis   -c/w evista po qd     Constipation  - pt had bm yest   -c/w senna po qhs     DVT ppx  -c/w scd boots b/l   - no dvt ppx at this time     GI ppc  -c/w ppi po qd     Activity level: Increase as tolerated    Dispo: Pt remains acute requiring further work up for pre syncope. PT/OT consults pending. Pt at baseline lives alone at home, has her daughters who help her and visit her frequently. Pt is independent in ADLs at baseline.            Addendum   Orthostatics negative and called by JESÚS Chavez as pt noted to go into new onset A FIB rvr, d/w Dr. West and will d/w losartan and give cardizem x 1 dose and start metoprolol 25mg po q6hrs with parameters. Will see if pt tolerates AV niraj blockades. CC: dizziness/ headache    [place] [facility] [place]       Overnight/ AM events:  Patient seen and examined at bedside. No acute events overnight. Daughter Ruthie at bedside. Patient states she is feeling better, headache intermittently recurs, frontal pressure like in character, 7/10. Associated intermittent photophobia at times. Pt reported that she was laying in bed and got up to try and go to the bathroom and she began feeling dizzy, and the sensation of feeling as if she was going to pass out. Pt reports she has been intermittently feeling this way on and off, usually when moving from positions. Aside from this the pt denies any sx of sob, chest pain or prior episodes of LOC. Patient denies visual disturbances, motor deficits, abd pain, N/V, fever, chills, dysuria or any other complaints. All remainder ROS negative.         Vital Signs Last 24 Hrs  T(C): --  T(F): --  HR: 91 (01 Sep 2021 10:00) (69 - 91)  BP: 156/81 (01 Sep 2021 10:00) (130/69 - 196/91)  BP(mean): 112 (01 Sep 2021 10:00) (100 - 112)  RR: 19 (01 Sep 2021 10:00) (17 - 19)  SpO2: 97% (01 Sep 2021 10:00) (96% - 99%)        CONSTITUTIONAL: Well appearing, well nourished, awake, alert and in no apparent distress  ENMT: Head +ve left frontal healed wound  - Airway patent, Nasal mucosa clear. Mouth with normal mucosa.    EYES: Clear bilaterally, pupils equal, round and reactive to light. EOMI.  CARDIAC: Normal rate, regular rhythm.  Heart sounds S1, S2.  No murmurs, rubs or gallops   RESPIRATORY: Breath sounds clear and equal bilaterally. No wheezes, rhales or rhonchi  GASTROENTEROLOGY: Soft nt nd bs +normoactive   MUSCULOSKELETAL: Spine appears normal, range of motion is not limited Strength wnl   EXTREMITIES: No edema, cyanosis or deformity   NEUROLOGICAL: Alert and oriented, no focal deficits, no motor or sensory deficits.  SKIN: poor skin turgor, b/l knees with healed abrasions         A/P: 88 y.o. F with hx HTN and osteoporosis, recently discharged from [facility] on 8/12 after a mechanical fall 8/10/21, resulting in a left sided subdural hematoma. Patient was discharged from [facility] to home with outpatient follow up. Post discharge pt reported recurrent headaches controlled on tylenol, she followed up outpt neurology and had repeat ct head on 8/31 for non contrasted CTH study which showed an increase in the accumulation of fluid since her previous scan on 8/12. Pt was sent to [facility] due to the CT findings coupled with the consistent headache and an episode of near syncope.  A repeat scan was performed at [facility] and showed a stable subdural with no changes from previous outpatient study. She was then flown into [facility] via [place] as a code trauma B which was cancelled upon arrival. Neurosurgery and NSICU were consulted and patient was admitted to the NSICU for observation overnight. HTN urgency noted on admission with SBP on arrival which was controlled with IV and po bp meds. Neurologically pt remained stable. Neuro ICU downgraded the pt to the medicine team to further manage the pre syncope.     Admit to telemetry        Left frontoparietal subdural hematoma  - pt with intermittent recurring headache well controlled on tylenol   - [place] ct head from [facility] [place]   - per neuro ICU no repeat ct head needed   -c/w neurochecks q4hrs x 24 hrs then will reassess  - recommend STAT CTH for any decline in mental status  -SBP goal 100-160  - c/w tylenol prn   - neurosx/ neuro ICU transfer note appreciated  - PT/OT consulted     Presyncope   - prior 2 episodes similar, as per pt on prior hospitalization was syncope not mechanical fall   - stat orthostatics and will perform daily orthostatics    - suspected that pt may have degree of orthostatic changes as pt has had decreased po intake and decreased fluid intake   - EKG NSR @ 74bpm  non specific st changes   - c/w tele monitoring for arrythmia   - pt advised to pay close attention to her hydration/ nutrition   - will c/w IVF x 24 hrs   - recommended by cardio for ILR in the am  - cardio discussing with the pt and daughter in regards to the procedure   - tentatively planned for tomorrow  - npo at midnight   - [facility] cardiology consulted and d/w Dr. West the above plan of care     HTN urgency  - sbp initially 190 range  - now down trended to 140-150 range  - will perform orthostatics   -d/c norvasc and recommended to start losartan 50mg po qd in the am per cardio   - if bp is elevated tonight will start tonight and after orthostatics completed   -c/w hydralzine IV prn per neuro recommendation to ensure bp within stable range     HLD  -c/w atorvastatin po qhs     Osteoporosis   -c/w evista po qd     Constipation  - pt had bm yest   -c/w senna po qhs     DVT ppx  -c/w scd boots b/l   - no dvt ppx at this time     GI ppc  -c/w ppi po qd     Activity level: Increase as tolerated    Dispo: Pt remains acute requiring further work up for pre syncope. PT/OT consults pending. Pt at baseline lives alone at home, has her daughters who help her and visit her frequently. Pt is independent in ADLs at baseline.            Addendum   Orthostatics negative and called by JESÚS Chavez as pt noted to go into new onset A FIB rvr, d/w Dr. West and will d/w losartan and give cardizem x 1 dose and start metoprolol 25mg po q6hrs with parameters. Will see if pt tolerates AV niraj blockades. CC: dizziness/ headache    Transfer [place] [facility] [place]       Overnight/ AM events:  Patient seen and examined at bedside. No acute events overnight. Daughter Ruthie at bedside. Patient states she is feeling better, headache intermittently recurs, frontal pressure like in character, 7/10. Associated intermittent photophobia at times. Pt reported that she was laying in bed and got up to try and go to the bathroom and she began feeling dizzy, and the sensation of feeling as if she was going to pass out. Pt reports she has been intermittently feeling this way on and off, usually when moving from positions. Aside from this the pt denies any sx of sob, chest pain or prior episodes of LOC. Patient denies visual disturbances, motor deficits, abd pain, N/V, fever, chills, dysuria or any other complaints. All remainder ROS negative.         Vital Signs Last 24 Hrs  T(C): --  T(F): --  HR: 91 (01 Sep 2021 10:00) (69 - 91)  BP: 156/81 (01 Sep 2021 10:00) (130/69 - 196/91)  BP(mean): 112 (01 Sep 2021 10:00) (100 - 112)  RR: 19 (01 Sep 2021 10:00) (17 - 19)  SpO2: 97% (01 Sep 2021 10:00) (96% - 99%)        CONSTITUTIONAL: Well appearing, well nourished, awake, alert and in no apparent distress  ENMT: Head +ve left frontal healed wound  - Airway patent, Nasal mucosa clear. Mouth with normal mucosa.    EYES: Clear bilaterally, pupils equal, round and reactive to light. EOMI.  CARDIAC: Normal rate, regular rhythm.  Heart sounds S1, S2.  No murmurs, rubs or gallops   RESPIRATORY: Breath sounds clear and equal bilaterally. No wheezes, rhales or rhonchi  GASTROENTEROLOGY: Soft nt nd bs +normoactive   MUSCULOSKELETAL: Spine appears normal, range of motion is not limited Strength wnl   EXTREMITIES: No edema, cyanosis or deformity   NEUROLOGICAL: Alert and oriented, no focal deficits, no motor or sensory deficits.  SKIN: poor skin turgor, b/l knees with healed abrasions         A/P: 88 y.o. F with hx HTN and osteoporosis, recently discharged from [facility] on 8/12 after a mechanical fall 8/10/21, resulting in a left sided subdural hematoma. Patient was discharged from [facility] to home with outpatient follow up. Post discharge pt reported recurrent headaches controlled on tylenol, she followed up outpt neurology and had repeat ct head on 8/31 for non contrasted CTH study which showed an increase in the accumulation of fluid since her previous scan on 8/12. Pt was sent to [facility] due to the CT findings coupled with the consistent headache and an episode of near syncope.  A repeat scan was performed at [facility] and showed a stable subdural with no changes from previous outpatient study. She was then flown into [facility] via [employer] as a code trauma B which was cancelled upon arrival. Neurosurgery and NSICU were consulted and patient was admitted to the NSICU for observation overnight. HTN urgency noted on admission with SBP on arrival which was controlled with IV and po bp meds. Neurologically pt remained stable. Neuro ICU downgraded the pt to the medicine team to further manage the pre syncope.     Admit to telemetry        Left frontoparietal subdural hematoma  - pt with intermittent recurring headache well controlled on tylenol   - repeat ct head from [facility] stable   - per neuro ICU no repeat ct head needed, [facility] records and ct imaging reviewed in alpha   -c/w neurochecks q4hrs x 24 hrs then will reassess  - recommend STAT CTH for any decline in mental status  -SBP goal 100-160  - c/w tylenol prn   - neurosx/ neuro ICU transfer note appreciated  - PT/OT consulted awaiting further recommendations    Presyncope   - prior 2 episodes similar, as per pt on prior hospitalization was syncope not mechanical fall   - stat orthostatics and will perform daily orthostatics    - suspected that pt may have degree of orthostatic changes as pt has had decreased po intake and decreased fluid intake   - EKG NSR @ 74bpm  non specific st changes   - c/w tele monitoring for arrythmia   - pt advised to pay close attention to her hydration/ nutrition   - will c/w IVF x 24 hrs   - recommended by cardio for ILR in the am  - cardio discussing with the pt and daughter in regards to the procedure   - tentatively planned for tomorrow  - npo at midnight   -c/w fall precautions   - [facility] cardiology consulted and d/w Dr. West the above plan of care     HTN urgency  - sbp initially 190 range  - now down trended to 140-150 range  - will perform orthostatics   -d/c norvasc and recommended to start losartan 50mg po qd in the am per cardio   - if bp is elevated tonight will start tonight and after orthostatics completed   -c/w hydralzine IV prn per neuro recommendation to ensure bp within stable range     HLD  -c/w atorvastatin po qhs     Osteoporosis   -c/w evista po qd     Constipation  - pt had bm yest   -c/w senna po qhs     DVT ppx  -c/w scd boots b/l   - no dvt ppx at this time secondary to SDH     GI ppc  -c/w ppi po qd     Activity level: Increase as tolerated    Dispo: Pt remains acute requiring further work up for pre syncope. PT/OT consults pending. Pt at baseline lives alone at home, has her daughters who help her and visit her frequently. Pt is independent in ADLs at baseline.            Addendum   Orthostatics negative and called by JESÚS Chavez as pt noted to go into new onset A FIB rvr, d/w Dr. West and will d/w losartan and give cardizem x 1 dose and start metoprolol 25mg po q6hrs with parameters. Will see if pt tolerates AV niraj blockades. CC: dizziness/ headache    Transfer from [facility] for SDH       Overnight/ AM events:  Patient seen and examined at bedside. No acute events overnight. Daughter Ruthie at bedside. Patient states she is feeling better, headache intermittently recurs, frontal pressure like in character, 7/10. Associated intermittent photophobia at times. Pt reported that she was laying in bed and got up to try and go to the bathroom and she began feeling dizzy, and the sensation of feeling as if she was going to pass out. Pt reports she has been intermittently feeling this way on and off, usually when moving from positions. Aside from this the pt denies any sx of sob, chest pain or prior episodes of LOC. Patient denies visual disturbances, motor deficits, abd pain, N/V, fever, chills, dysuria or any other complaints. All remainder ROS negative.         Vital Signs Last 24 Hrs  T(C): --  T(F): --  HR: 91 (01 Sep 2021 10:00) (69 - 91)  BP: 156/81 (01 Sep 2021 10:00) (130/69 - 196/91)  BP(mean): 112 (01 Sep 2021 10:00) (100 - 112)  RR: 19 (01 Sep 2021 10:00) (17 - 19)  SpO2: 97% (01 Sep 2021 10:00) (96% - 99%)        CONSTITUTIONAL: Well appearing, well nourished, awake, alert and in no apparent distress  ENMT: Head +ve left frontal healed wound  - Airway patent, Nasal mucosa clear. Mouth with normal mucosa.    EYES: Clear bilaterally, pupils equal, round and reactive to light. EOMI.  CARDIAC: Normal rate, regular rhythm.  Heart sounds S1, S2.  No murmurs, rubs or gallops   RESPIRATORY: Breath sounds clear and equal bilaterally. No wheezes, rhales or rhonchi  GASTROENTEROLOGY: Soft nt nd bs +normoactive   MUSCULOSKELETAL: Spine appears normal, range of motion is not limited Strength wnl   EXTREMITIES: No edema, cyanosis or deformity   NEUROLOGICAL: Alert and oriented, no focal deficits, no motor or sensory deficits.  SKIN: poor skin turgor, b/l knees with healed abrasions         A/P: 88 y.o. F with hx HTN and osteoporosis, recently discharged from [facility] on 8/12 after a mechanical fall 8/10/21, resulting in a left sided subdural hematoma. Patient was discharged from [facility] to home with outpatient follow up. Post discharge pt reported recurrent headaches controlled on tylenol, she followed up outpt neurology and had repeat ct head on 8/31 for non contrasted CTH study which showed an increase in the accumulation of fluid since her previous scan on 8/12. Pt was sent to [facility] due to the CT findings coupled with the consistent headache and an episode of near syncope.  A repeat scan was performed at [facility] and showed a stable subdural with no changes from previous outpatient study. She was then flown into [facility] via [employer] as a code trauma B which was cancelled upon arrival. Neurosurgery and NSICU were consulted and patient was admitted to the NSICU for observation overnight. HTN urgency noted on admission with SBP on arrival which was controlled with IV and po bp meds. Neurologically pt remained stable. Neuro ICU downgraded the pt to the medicine team to further manage the pre syncope.     Admit to telemetry        Left frontoparietal subdural hematoma  - pt with intermittent recurring headache well controlled on tylenol   - repeat ct head from [facility] stable   - per neuro ICU no repeat ct head needed, [facility] records and ct imaging reviewed in alpha   -c/w neurochecks q4hrs x 24 hrs then will reassess  - recommend STAT CTH for any decline in mental status  -SBP goal 100-160  - c/w tylenol prn   - neurosx/ neuro ICU transfer note appreciated  - PT/OT consulted awaiting further recommendations    Presyncope   - prior 2 episodes similar, as per pt on prior hospitalization was syncope not mechanical fall   - stat orthostatics and will perform daily orthostatics    - suspected that pt may have degree of orthostatic changes as pt has had decreased po intake and decreased fluid intake   - EKG NSR @ 74bpm  non specific st changes   - c/w tele monitoring for arrythmia   - pt advised to pay close attention to her hydration/ nutrition   - will c/w IVF x 24 hrs   - recommended by cardio for ILR in the am  - cardio discussing with the pt and daughter in regards to the procedure   - tentatively planned for tomorrow  - npo at midnight   -c/w fall precautions   - [facility] cardiology consulted and d/w Dr. West the above plan of care     HTN urgency  - sbp initially 190 range  - now down trended to 140-150 range  - will perform orthostatics   -d/c norvasc and recommended to start losartan 50mg po qd in the am per cardio   - if bp is elevated tonight will start tonight and after orthostatics completed   -c/w hydralzine IV prn per neuro recommendation to ensure bp within stable range     HLD  -c/w atorvastatin po qhs     Osteoporosis   -c/w evista po qd     Constipation  - pt had bm yest   -c/w senna po qhs     DVT ppx  -c/w scd boots b/l   - no dvt ppx at this time secondary to SDH     GI ppc  -c/w ppi po qd     Activity level: Increase as tolerated    Dispo: Pt remains acute requiring further work up for pre syncope. PT/OT consults pending. Pt at baseline lives alone at home, has her daughters who help her and visit her frequently. Pt is independent in ADLs at baseline.            Addendum   Orthostatics negative and called by JESÚS Chavez as pt noted to go into new onset A FIB rvr, d/w Dr. West and will d/c losartan and give cardizem x 1 dose and start metoprolol 25mg po q6hrs with parameters. Will see if pt tolerates AV niraj blockades. Pending overnight events will see if pt still proceeds with ILR pending further cardiac recommendations.

## 2021-09-01 NOTE — ED PROVIDER NOTE - OBJECTIVE STATEMENT
89 y/o female with PMHx of HTN, subdural p/w headache. Pt had 3 syncopal at home headache with nausea subdural from fall 3 weeks ago, brought to [facility] where she had CT showing left frontal and parietal acute on subacute subdural with 3mm shift. Pt given 10 mg labetelol push around 01:00. Pt denies new fall.

## 2021-09-01 NOTE — H&P ADULT - NSHPSOCIALHISTORY_GEN_ALL_CORE
patient denies smoking, ETOH or drug use  Lives alone at home patient denies smoking, ETOH or drug use  Lives alone at home, daughter lives near

## 2021-09-01 NOTE — PROGRESS NOTE ADULT - ASSESSMENT
ASSESSMENT/PLAN:     NEURO:   SBP<160  q2hr neurochecks  repeat CTH for stability  pain mgt: tylenol and oxy 5 prn  Activity: [x] mobilize as tolerated [] Bedrest [x] PT [x] OT [] PMNR    PULM: room air   SpO2>92%  incentive spirometry   OOB    CV:  SBP goal <160  cont norvasc, statin    RENAL: monitor I/O  replete lytes prn  voiding  Fluids: IVF while NPO    GI:  Diet: advance diet as tolerated  GI prophylaxis [x] not indicated   zofran prn for nausea  Bowel regimen [x] senna [] other:    ENDO:   Goal euglycemia (-180)    HEME/ONC:  VTE prophylaxis: [] SCDs [x] chemoprophylaxis held 24hrs post-bleed     ID: afebrile   no leukocytosis    MISC: home raloxifene    I affirm that this patient is critically ill and at high risk for sudden, fatal deterioration due to one or more of the above stated active issues.     This time does not include bedside procedures that are documented separately.   ASSESSMENT/PLAN:  88y old  Female who presents with a chief complaint of [place] [facility] [place] in the setting of chronic subdural hematoma   SDH   h/o nephrectomy  HTN    NEURO:   SBP<160  q2hr neurochecks  repeat CTH for stability  pain mgt: tylenol and oxy 5 prn  Activity: [x] mobilize as tolerated [] Bedrest [x] PT [x] OT [] PMNR    PULM: room air   SpO2>92%  incentive spirometry   OOB    CV:  SBP goal <160  cont norvasc, statin    RENAL: monitor I/O  replete lytes prn  voiding    GI:  Diet: advance diet as tolerated  GI prophylaxis [x] not indicated   zofran prn for nausea  Bowel regimen [x] senna [] other:    ENDO:   Goal euglycemia (-180)  A1c 5.3    HEME/ONC:  VTE prophylaxis: [] SCDs [x] chemoprophylaxis held 24hrs post-bleed     ID: afebrile   no leukocytosis    MISC: home raloxifene    I affirm that this patient is critically ill and at high risk for sudden, fatal deterioration due to one or more of the above stated active issues.     This time does not include bedside procedures that are documented separately.   ASSESSMENT/PLAN:  88y old  Female who presents with a chief complaint of Transfer from [facility] [place] for recurrent HA in the setting of chronic subdural hematoma   SDH   h/o nephrectomy  HTN    NEURO:   SBP<160  q4hr neurochecks  repeat CTH for stability  pain mgt: tylenol and oxy 5 prn  Activity: [x] mobilize as tolerated [] Bedrest [x] PT [x] OT [] PMNR    PULM: room air   SpO2>92%  incentive spirometry   OOB    CV:  SBP goal <160  cont norvasc, statin    RENAL: monitor I/O  replete lytes prn  voiding    GI:  Diet: advance diet as tolerated  GI prophylaxis [x] not indicated   zofran prn for nausea  Bowel regimen [x] senna [] other:    ENDO:   Goal euglycemia (-180)  A1c 5.3    HEME/ONC:  VTE prophylaxis: [] SCDs [x] chemoprophylaxis held 24hrs post-bleed     ID: afebrile   no leukocytosis    MISC: home raloxifene    I affirm that this patient is critically ill and at high risk for sudden, fatal deterioration due to one or more of the above stated active issues.     This time does not include bedside procedures that are documented separately.

## 2021-09-01 NOTE — H&P ADULT - NSHPLABSRESULTS_GEN_ALL_CORE
[place] [facility] [place]ION:  Left frontoparietal mixed attenuation subdural hematoma containing acute and subacute components resulting in 3 mm of left to right midline shift, not significantly changed compared with the examination from earlier the same day. Acute subdural hemorrhage layering on the left tentorium, not significantly changed compared with the examination from earlier the same day.    Call Back: I discussed this case with Dr. Rausch at 12:14 AM on 9/1/2021. Hospital policies for call back including read back policy were followed. The verbal communication call back supplements this written report.    --- End of Report ---                  CHARLES JAIN MD; Attending Radiologist  This document has been electronically signed. Sep 1 2021 12:19AM CT head on 8/31 @ 1153pm at [facility] [place]   IMPRESSION:  Left frontoparietal mixed attenuation subdural hematoma containing acute and subacute components resulting in 3 mm of left to right midline shift, not significantly changed compared with the examination from earlier the same day. Acute subdural hemorrhage layering on the left tentorium, not significantly changed compared with the examination from earlier the same day.    Call Back: I discussed this case with Dr. Rausch at 12:14 AM on 9/1/2021. Hospital policies for call back including read back policy were followed. The verbal communication call back supplements this written report.    --- End of Report ---    CHARLES JAIN MD; Attending Radiologist  This document has been electronically signed. Sep 1 2021 12:19AM

## 2021-09-01 NOTE — CHART NOTE - NSCHARTNOTEFT_GEN_A_CORE
HPI:  88F with PMH of HTN, cholecystectomy, nephrectomy, with recent discharge from [facility] on 8/12 after a mechanical fall 8/10/21, resulting in a left sided subdural hematoma. Patient was discharged from [facility] to home with outpatient follow up. Post discharge she has been having recurrent headaches stated to be on the top of her head in which she has been taking Tylenol at home and avoiding NSAIDs or any other antiplatelet / anticoag medications. She appeared for an outpatient neurological follow up on 8/31 for non contrasted CTH study. The outpatient CTH showed an increase in the accumulation of fluid since her previous scan on 8/12. She was sent to [facility] [place] due to the CT findings coupled with the consistent headache and an episode of near syncope today. Denies recent fall or illness.   A repeat scan was performed at [facility] [place] and showed a stable subdural with no changes from previous outpatient study. She was then flown into [facility] via [employer] as a code trauma B which was cancelled upon arrival. Neurosurgery and NSICU were consulted and patient was admitted to the NSICU for observation overnight. SBP on arrival was 196 and received 10 of labetalol en route and 10 of hydralazine in the ED.     Hospital Course:  9/1: Admitted to neuroICU. Home meds started. Blood pressure controlled with PO/PRN medications - did not require Cardene. UA negative. Neurologically intact and appropriate for downgrade to medical floor.     ICU Vital Signs Last 24 Hrs:  T(C): --  T(F): --  HR: 84 (01 Sep 2021 08:00) (69 - 87)  BP: 145/77 (01 Sep 2021 08:00) (130/69 - 196/91)  BP(mean): 106 (01 Sep 2021 08:00) (104 - 106)  RR: 17 (01 Sep 2021 08:00) (17 - 18)  SpO2: 96% (01 Sep 2021 08:00) (96% - 99%)    Physical Exam:  GENERAL: NAD, lying comfortably in bed, well-groomed   HEAD:  Normocephalic, small area of healing laceration from previous visit on left side of forehead   EYES: EOMI, PERRL, conjunctiva and sclera clear b/l  NECK: Supple, No JVD  DARREN COMA SCORE: E-4 V-5 M-6 = 15  MENTAL STATUS: AAO x3; Awake; Opens eyes spontaneously; Appropriately conversant without aphasia; Following commands   CRANIAL NERVES: Visual fields full to confrontation, PERRL. EOMI without nystagmus. Facial sensation intact V1-3 distribution b/l. Face symmetric w/ normal eye closure and smile, tongue midline. Hearing grossly intact. Speech clear.   MOTOR: strength 5/5 b/l upper and lower extremities  SENSATION: grossly intact to light touch all extremities  CHEST/LUNG: Clear to auscultation bilaterally; No rales, rhonchi, wheezing, or rubs. Nonlabored respirations  HEART: Regular rate and rhythm; No murmurs, rubs, or gallops  ABDOMEN: Soft, Nontender, Nondistended   SKIN: No rashes or lesions    Radiology:  8/31/21 CTH from [facility] [place]:  IMPRESSION:  Left frontoparietal mixed attenuation subdural hematoma containing acute and subacute components resulting in 3 mm of left to right midline shift, not significantly changed compared with the examination from earlier the same day. Acute subdural hemorrhage layering on the left tentorium, not significantly changed compared with the examination from earlier the same day.    8/31/21 CTH:  IMPRESSION:  Normal evolution of a left frontal parietal subacute subdural hematoma compared with 8/12/2021. The collection is now low density but is slightly larger with slightly increased mass effect on the adjacent cerebral cortical sulci. No new hemorrhage.    Assessment:  88F with previous admission for SDH s/p mechanical fall, now presenting to [facility] with chronic L SDH and increased fluid component after an outpatient follow up CT and stability scan at [facility] [place]. Patient complains of headache since previous admission, denies trauma since last admission and has no focal neurological deficits. Also complains of presyncopal event 8/31.     Plan:  -D/w attending Dr. Bowie  -D/w attending neurosurgeon Dr. Garcia  -Patient neurologically intact, remains stable, and appropriate for downgrade to floor under medicine service. Patient signed out to and accepted by hospitalist Dr. Frost. Patient discussed at length, all questions answered   -Neurosurgery team made aware and agreed with downgrade - will continue to follow   -Q4 hour neuro checks   -STAT CTH for any decline in mental status  -SBP goal 100-160  -Amlodipine 2.5 mg daily   -Lipitor 40mg daily   -Hydralazine PRN  -Saturating well on room air   -Maintain SpO2>92%  -Regular diet   -Protonix 40mg daily   -Voiding   -SCDs b/l for DVT prophylaxis   -Raloxifene 60mg daily   -Nitrofurantoin 50mg daily   -UA negative   -PT/OT, dispo planning HPI:  88F with PMH of HTN, cholecystectomy, nephrectomy, with recent discharge from [facility] on 8/12 after a mechanical fall 8/10/21, resulting in a left sided subdural hematoma. Patient was discharged from [facility] to home with outpatient follow up. Post discharge she has been having recurrent headaches stated to be on the top of her head in which she has been taking Tylenol at home and avoiding NSAIDs or any other antiplatelet / anticoag medications. She appeared for an outpatient neurological follow up on 8/31 for non contrasted CTH study. The outpatient CTH showed an increase in the accumulation of fluid since her previous scan on 8/12. She was sent to [facility] [place] due to the CT findings coupled with the consistent headache and an episode of near syncope today. Denies recent fall or illness.   A repeat scan was performed at [facility] and showed a stable subdural with no changes from previous outpatient study. She was then flown into [facility] via [employer] as a code trauma B which was cancelled upon arrival. Neurosurgery and NSICU were consulted and patient was admitted to the NSICU for observation overnight. SBP on arrival was 196 and received 10 of labetalol en route and 10 of hydralazine in the ED.     Hospital Course:  9/1: Admitted to neuroICU. Home meds started. Blood pressure controlled with PO/PRN medications - did not require Cardene. UA negative. Neurologically intact and appropriate for downgrade to medical floor.     ICU Vital Signs Last 24 Hrs:  T(C): --  T(F): --  HR: 84 (01 Sep 2021 08:00) (69 - 87)  BP: 145/77 (01 Sep 2021 08:00) (130/69 - 196/91)  BP(mean): 106 (01 Sep 2021 08:00) (104 - 106)  RR: 17 (01 Sep 2021 08:00) (17 - 18)  SpO2: 96% (01 Sep 2021 08:00) (96% - 99%)    Physical Exam:  GENERAL: NAD, lying comfortably in bed, well-groomed   HEAD:  Normocephalic, small area of healing laceration from previous visit on left side of forehead   EYES: EOMI, PERRL, conjunctiva and sclera clear b/l  NECK: Supple, No JVD  DARREN COMA SCORE: E-4 V-5 M-6 = 15  MENTAL STATUS: AAO x3; Awake; Opens eyes spontaneously; Appropriately conversant without aphasia; Following commands   CRANIAL NERVES: Visual fields full to confrontation, PERRL. EOMI without nystagmus. Facial sensation intact V1-3 distribution b/l. Face symmetric w/ normal eye closure and smile, tongue midline. Hearing grossly intact. Speech clear.   MOTOR: strength 5/5 b/l upper and lower extremities  SENSATION: grossly intact to light touch all extremities  CHEST/LUNG: Clear to auscultation bilaterally; No rales, rhonchi, wheezing, or rubs. Nonlabored respirations  HEART: Regular rate and rhythm; No murmurs, rubs, or gallops  ABDOMEN: Soft, Nontender, Nondistended   SKIN: No rashes or lesions    Radiology:  8/31/21 CTH from [facility] [place]:  IMPRESSION:  Left frontoparietal mixed attenuation subdural hematoma containing acute and subacute components resulting in 3 mm of left to right midline shift, not significantly changed compared with the examination from earlier the same day. Acute subdural hemorrhage layering on the left tentorium, not significantly changed compared with the examination from earlier the same day.    8/31/21 CTH:  IMPRESSION:  Normal evolution of a left frontal parietal subacute subdural hematoma compared with 8/12/2021. The collection is now low density but is slightly larger with slightly increased mass effect on the adjacent cerebral cortical sulci. No new hemorrhage.    Assessment:  88F with previous admission for SDH s/p mechanical fall, now presenting to [facility] with chronic L SDH and increased fluid component after an outpatient follow up CT and stability scan at [facility]. Patient complains of headache since previous admission, denies trauma since last admission and has no focal neurological deficits. Also complains of presyncopal event 8/31.     Plan:  -D/w attending Dr. Bowie  -D/w attending neurosurgeon Dr. Garcia  -Patient neurologically intact, remains stable, and appropriate for downgrade to floor under medicine service. Patient signed out to and accepted by hospitalist Dr. Frost. Patient discussed at length, all questions answered   -Neurosurgery team made aware and agreed with downgrade - will continue to follow   -All imaging reviewed with attendings   -Q4 hour neuro checks   -STAT CTH for any decline in mental status  -SBP goal 100-160  -Amlodipine 2.5 mg daily   -Lipitor 40mg daily   -Hydralazine PRN  -Saturating well on room air   -Maintain SpO2>92%  -Regular diet   -Protonix 40mg daily   -Voiding   -SCDs b/l for DVT prophylaxis   -Raloxifene 60mg daily   -Nitrofurantoin 50mg daily   -UA negative   -PT/OT, dispo planning

## 2021-09-01 NOTE — CHART NOTE - NSCHARTNOTEFT_GEN_A_CORE
Event note     called by RN for new onset Afib while obtaining orthostatics @1245.   pt seen/examined at bedside, NAD. EKG obtained stat Afib , nonishcemic. Pt denies CP/Sob, palpitations, orthopnea, dizziness. Orthostatics negative.       T(F): 98.2 (09-01-21 @ 14:09)  HR: 73 (09-01-21 @ 14:09)  BP: 109/70 (09-01-21 @ 14:09)  BP(mean): 112 (09-01-21 @ 11:17)  RR: 18 (09-01-21 @ 14:09)  SpO2: 96% (09-01-21 @ 14:09)    PHYSICAL EXAM:   Neurological: AAOx3, CNII-XII intact,  strength 5/5 b/l  Cardiovascular: RRR  Respiratory: CTA  Gastrointestinal: soft, NT, ND, BS+  Extremities: warm, no dependent edema  Vascular: no cyanosis/erythema  LABS:    09-01    143  |  105  |  12.5  ----------------------------<  148<H>  4.0   |  23.0  |  0.83    Ca    9.0      01 Sep 2021 02:21    TPro  6.9  /  Alb  4.3  /  TBili  0.3<L>  /  DBili  x   /  AST  14  /  ALT  8   /  AlkPhos  70  09-01  LIVER FUNCTIONS - ( 01 Sep 2021 02:21 )  Alb: 4.3 g/dL / Pro: 6.9 g/dL / ALK PHOS: 70 U/L / ALT: 8 U/L / AST: 14 U/L / GGT: x                               12.8   6.31  )-----------( 187      ( 01 Sep 2021 02:21 )             40.4   PT/INR - ( 01 Sep 2021 02:21 )   PT: 11.7 sec;   INR: 1.01 ratio         PTT - ( 01 Sep 2021 02:21 )  PTT:36.6 sec  CAPILLARY BLOOD GLUCOSE          A/P:  1. New onset Afib with RVR  -Cardizem 10mg IV x 1 stat  -start metoprolol tartrate 25mg po q6hrs with hold parameters  -given fall risk and h/o of SDH, not a candidate for AC  -continue tele monitoring  -discussed case with Dr. Brett LUCERO tomorrow am       Follow up now 1445 Afib controlled rate 70-80's, asymptomatic     above event discussed with Attg

## 2021-09-01 NOTE — PROGRESS NOTE ADULT - SUBJECTIVE AND OBJECTIVE BOX
Chief complaint:   Patient is a 88y old  Female who presents with a chief complaint of Transfer from [place] for recurrent HA in the setting of chronic subdural hematoma (01 Sep 2021 02:53)    HPI:  88F with recent discharge from [facility] on 8/12 after a mechanical fall 8/10/21,  resulting in a left sided subdural hematoma. Patient was discharged from [facility] to home with outpatient follow up. Post discharge she has been having recurrent headaches stated to be on the top of her head in which she has been taking tylenol at home and avoiding NSAIDs or any other antiplatelet / anticoag medications. She appeared for an outpatient neurological follow up on 8/31 for non contrasted CTH study. The outpatient CTH showed an increase in the accumulation of fluid since her previous scan on 8/12. She was sent to [place] due to the CT findings coupled with the consistent headache and an episode of near syncope today. Denies recent fall or illness.    A repeat scan was performed at [place] and showed a stable subdural with no changes from previous outpatient study. She was then flown into [facility] via [employer] as a code trauma B which was cancelled upon arrival. Neurosurgery and NSICU were consulted and patient was admitted to the NSICU for observation overnight. SBP on arrival was 196 and received 10 of labetalol en route and 10 of hydralazine in the ED.  (01 Sep 2021 02:53)        24hr EVENTS:      ROS: [ ]  Unable to assess due to mental status   All other systems negative    -----------------------------------------------------------------------------------------------------------------------------------------------------------------------------------  ICU Vital Signs Last 24 Hrs  T(C): --  T(F): --  HR: 84 (01 Sep 2021 08:00) (69 - 87)  BP: 145/77 (01 Sep 2021 08:00) (130/69 - 196/91)  BP(mean): 106 (01 Sep 2021 08:00) (104 - 106)  ABP: --  ABP(mean): --  RR: 17 (01 Sep 2021 08:00) (17 - 18)  SpO2: 96% (01 Sep 2021 08:00) (96% - 99%)      I&O's Summary      MEDICATIONS  (STANDING):  acetaminophen  IVPB .. 1000 milliGRAM(s) IV Intermittent once  amLODIPine   Tablet 2.5 milliGRAM(s) Oral daily  atorvastatin 40 milliGRAM(s) Oral at bedtime  nitrofurantoin macrocrystals (MACRODANTIN) 50 milliGRAM(s) Oral <User Schedule>  pantoprazole    Tablet 40 milliGRAM(s) Oral before breakfast  raloxifene 60 milliGRAM(s) Oral daily      RESPIRATORY:        IMAGING:   Recent imaging studies were reviewed.    LAB RESULTS:                          12.8   6.31  )-----------( 187      ( 01 Sep 2021 02:21 )             40.4       PT/INR - ( 01 Sep 2021 02:21 )   PT: 11.7 sec;   INR: 1.01 ratio         PTT - ( 01 Sep 2021 02:21 )  PTT:36.6 sec    09-01    143  |  105  |  12.5  ----------------------------<  148<H>  4.0   |  23.0  |  0.83    Ca    9.0      01 Sep 2021 02:21    TPro  6.9  /  Alb  4.3  /  TBili  0.3<L>  /  DBili  x   /  AST  14  /  ALT  8   /  AlkPhos  70  09-01      -----------------------------------------------------------------------------------------------------------------------------------------------------------------------------------    PHYSICAL EXAM:  General: Calm, laying in bed  HEENT: MMM  Neuro:  -Mental status- No acute distress, AOx3, conversational, following commands  -CN- PERRL 3mm, EOMI, tongue midline, face symmetric  -Motor- full strength in all ext  -Sensation- intact to LT   -Coordination- no dysmetria noted    CV: RRR  Pulm: Clear to auscultation  Abd: Soft, nontender, nondistended  Ext: No edema  Skin: warm, dry       Chief complaint:   Patient is a 88y old  Female who presents with a chief complaint of Transfer from [facility] for recurrent HA in the setting of chronic subdural hematoma (01 Sep 2021 02:53)    HPI:  88F with recent discharge from [facility] on 8/12 after a mechanical fall 8/10/21,  resulting in a left sided subdural hematoma. Patient was discharged from [facility] to home with outpatient follow up. Post discharge she has been having recurrent headaches stated to be on the top of her head in which she has been taking tylenol at home and avoiding NSAIDs or any other antiplatelet / anticoag medications. She appeared for an outpatient neurological follow up on 8/31 for non contrasted CTH study. The outpatient CTH showed an increase in the accumulation of fluid since her previous scan on 8/12. She was sent to [facility] [place] due to the CT findings coupled with the consistent headache and an episode of near syncope today. Denies recent fall or illness.    A repeat scan was performed at [facility] [place] [facility] and showed a stable subdural with no changes from previous outpatient study. She was then flown into [facility] via [employer] as a code trauma B which was cancelled upon arrival. Neurosurgery and NSICU were consulted and patient was admitted to the NSICU for observation overnight. SBP on arrival was 196 and received 10 of labetalol en route and 10 of hydralazine in the ED.  (01 Sep 2021 02:53)    24hr EVENTS:  transferred from [facility] [place]    ROS: headache  All other systems negative    -----------------------------------------------------------------------------------------------------------------------------------------------------------------------------------  ICU Vital Signs Last 24 Hrs  T(C): --  T(F): --  HR: 84 (01 Sep 2021 08:00) (69 - 87)  BP: 145/77 (01 Sep 2021 08:00) (130/69 - 196/91)  BP(mean): 106 (01 Sep 2021 08:00) (104 - 106)  ABP: --  ABP(mean): --  RR: 17 (01 Sep 2021 08:00) (17 - 18)  SpO2: 96% (01 Sep 2021 08:00) (96% - 99%)      I&O's Summary      MEDICATIONS  (STANDING):  acetaminophen  IVPB .. 1000 milliGRAM(s) IV Intermittent once  amLODIPine   Tablet 2.5 milliGRAM(s) Oral daily  atorvastatin 40 milliGRAM(s) Oral at bedtime  nitrofurantoin macrocrystals (MACRODANTIN) 50 milliGRAM(s) Oral <User Schedule>  pantoprazole    Tablet 40 milliGRAM(s) Oral before breakfast  raloxifene 60 milliGRAM(s) Oral daily    IMAGING:   Recent imaging studies were reviewed.    LAB RESULTS:                          12.8   6.31  )-----------( 187      ( 01 Sep 2021 02:21 )             40.4       PT/INR - ( 01 Sep 2021 02:21 )   PT: 11.7 sec;   INR: 1.01 ratio         PTT - ( 01 Sep 2021 02:21 )  PTT:36.6 sec    09-01    143  |  105  |  12.5  ----------------------------<  148<H>  4.0   |  23.0  |  0.83    Ca    9.0      01 Sep 2021 02:21    TPro  6.9  /  Alb  4.3  /  TBili  0.3<L>  /  DBili  x   /  AST  14  /  ALT  8   /  AlkPhos  70  09-01      -----------------------------------------------------------------------------------------------------------------------------------------------------------------------------------    PHYSICAL EXAM:  General: Calm, laying in bed  HEENT: MMM  Neuro:  -Mental status- No acute distress, AOx3, conversational, following commands  -CN- PERRL 3mm, EOMI, tongue midline, face symmetric  -Motor- full strength in all ext  -Sensation- intact to LT   -Coordination- no dysmetria noted    CV: RRR  Pulm: Clear to auscultation  Abd: Soft, nontender, nondistended  Ext: No edema  Skin: warm, dry

## 2021-09-01 NOTE — CONSULT NOTE ADULT - SUBJECTIVE AND OBJECTIVE BOX
[facility], [facility]                                   [address] [place] [address]                                                      PHONE: [phone]                                                         FAX: [phone]  [email or web address]  ---------------------------------------------------------------------------------------------------------------------------------    Reason for Consult:    HPI:  SAMANTHA DONNELLY is an 88y Female with history of HTN who was recently discharged from [facility] on 8/12 after a mechanical fall vs syncope 8/10/21, resulting in a left sided subdural hematoma.  The patient now states that her first episodes was due to syncope. Patient was discharged from [facility] to home with outpatient follow up. Post discharge, patient states that she has recurrent headaches controlled on Tylenol was is being followed by neurology.  Patient had repeat CT head on 8/31 for non contrasted CTH study which showed an increase in the accumulation of fluid since her previous scan on 8/12. Patient was sent to [facility] due to the CT findings coupled with the consistent headache and an episode of near syncope.  A repeat scan was performed at [facility] and showed a stable subdural with no changes from previous outpatient study. She was then flown into [facility] via [employer] as a code trauma B which was cancelled upon arrival. Neurosurgery and NSICU were consulted and patient was admitted to the NSICU for observation overnight. Denies any chest pain orthopnea or PND.      PAST MEDICAL & SURGICAL HISTORY:  HTN (hypertension)    S/P cholecystectomy    S/p nephrectomy    S/P cataract surgery        codeine (Other)  penicillin (Hives)      MEDICATIONS  (STANDING):  atorvastatin 40 milliGRAM(s) Oral at bedtime  multiple electrolytes Injection Type 1 1000 milliLiter(s) (75 mL/Hr) IV Continuous <Continuous>  pantoprazole    Tablet 40 milliGRAM(s) Oral before breakfast  raloxifene 60 milliGRAM(s) Oral daily  senna 1 Tablet(s) Oral daily    MEDICATIONS  (PRN):  hydrALAZINE Injectable 10 milliGRAM(s) IV Push every 2 hours PRN SBP>160      Social History:  Cigarettes:                    Alchohol:                 Illicit Drug Abuse:      ROS: Negative other than as mentioned in HPI.    Vital Signs Last 24 Hrs  T(C): --  T(F): --  HR: 91 (01 Sep 2021 10:00) (69 - 91)  BP: 156/81 (01 Sep 2021 10:00) (130/69 - 196/91)  BP(mean): 112 (01 Sep 2021 10:00) (100 - 112)  RR: 19 (01 Sep 2021 10:00) (17 - 19)  SpO2: 97% (01 Sep 2021 10:00) (96% - 99%)  ICU Vital Signs Last 24 Hrs  SAMANTHA DONNELLY  I&O's Detail    01 Sep 2021 07:01  -  01 Sep 2021 11:15  --------------------------------------------------------  IN:    IV PiggyBack: 100 mL  Total IN: 100 mL    OUT:  Total OUT: 0 mL    Total NET: 100 mL        I&O's Summary    01 Sep 2021 07:01  -  01 Sep 2021 11:15  --------------------------------------------------------  IN: 100 mL / OUT: 0 mL / NET: 100 mL      Drug Dosing Weight  SAMANTHA CONY      PHYSICAL EXAM:  General: Appears well developed, well nourished alert and cooperative.  HEENT: Head; normocephalic, atraumatic.  Eyes: Pupils reactive, cornea wnl.  Neck: Supple, no nodes adenopathy, no NVD or carotid bruit or thyromegaly.  CARDIOVASCULAR: Normal S1 and S2, No murmur, rub, gallop or lift.   LUNGS: No rales, rhonchi or wheeze. Normal breath sounds bilaterally.  ABDOMEN: Soft, nontender without mass or organomegaly. bowel sounds normoactive.  EXTREMITIES: No clubbing, cyanosis or edema. Distal pulses wnl.   SKIN: warm and dry with normal turgor.  NEURO: Alert/oriented x 3/normal motor exam. No pathologic reflexes.    PSYCH: normal affect.        LABS:                        12.8   6.31  )-----------( 187      ( 01 Sep 2021 02:21 )             40.4     09-01    143  |  105  |  12.5  ----------------------------<  148<H>  4.0   |  23.0  |  0.83    Ca    9.0      01 Sep 2021 02:21    TPro  6.9  /  Alb  4.3  /  TBili  0.3<L>  /  DBili  x   /  AST  14  /  ALT  8   /  AlkPhos  70  09-01    SAMANTHA LAGRAVINESE      PT/INR - ( 01 Sep 2021 02:21 )   PT: 11.7 sec;   INR: 1.01 ratio         PTT - ( 01 Sep 2021 02:21 )  PTT:36.6 sec      RADIOLOGY & ADDITIONAL STUDIES:    INTERPRETATION OF TELEMETRY (personally reviewed):    ECG: NSR LVH none specific changes     ECHO:  < from: TTE Echo Complete w/ Contrast w/ Doppler (08.10.21 @ 21:04) >  Summary:   1. Normal global left ventricular systolic function.   2. Left ventricular ejection fraction, by visual estimation, is 65 to 70%.   3. Mildly increased posterior wall thickness. Moderately increased septal wall thickness.   4. Normal right ventricular size and function.   5. The left atrium is normal in size.   6. The right atrium is normal in size.   7. Mild thickening of the anterior and posterior mitral valve leaflets.   8. Moderate mitral annular calcification.   9. Mild to moderate mitral valve regurgitation.  10. Mild to moderate aortic regurgitation.  11. No aortic valve stenosis.  12. Trivial pericardial effusion.  13. Recommend clinical correlation with the above findings.    Ema Ferreira MD Electronically signed on 8/11/2021 at 11:06:09 AM    < end of copied text >      STRESS TEST: none     CARDIAC CATHETERIZATION: none       < from: US Duplex Carotid Arteries Complete, Bilateral (08.11.21 @ 17:07) >  IMPRESSION: Mild plaque in the carotid bulbs with no evidence of a hemodynamically significant stenosis in either internal carotid artery.    Measurement of carotid stenosis is based on velocity parameters that correlate the residual internal carotid diameter with that of the more distal vessel in accordance with a method such as the North American Symptomatic Carotid Endarterectomy Trial (NASCET).    < end of copied text >        < from: CT Head No Cont (08.12.21 @ 15:06) >  INTERPRETATION:  CLINICAL STATEMENT: Follow-up bleed    TECHNIQUE: CT of the head was performed without IV contrast.  RAPID artificial intelligence was utilized for the preliminary evaluation of intracranial hemorrhage.    COMPARISON: CT head 8/11/2021    FINDINGS:  There is mild diffuse parenchymal volume loss. There are areas of low attenuation in the periventricular white matter likely related to moderate chronic microvascular ischemic changes.    Small left acute left subdural hematoma without significant change in the left temporal region but slightly smaller in the left parietal region on the coronal plane measuring 6 mm in maximum width, previously measuring 7 mm.    There is no midline shift. There is no acute territorial infarct. There is no hydrocephalus.    The cranium is intact. Left frontal soft tissue swelling/hematoma. The visualized paranasal sinuses are well-aerated.    IMPRESSION:  Left subdural hematoma as described above    --- End of Report ---    < end of copied text >        Assessment and Plan:  In summary, SAMANTHA DONNELLY is an 88y Female with past medical history significant for HTN who was recently discharged from [facility] on 8/12 after a mechanical fall vs syncope 8/10/21, resulting in a left sided subdural hematoma.  The patient now states that her first episodes was due to syncope. Patient was discharged from [facility] to home with outpatient follow up. Post discharge, patient states that she has recurrent headaches controlled on Tylenol was is being followed by neurology.  Patient had repeat CT head on 8/31 for non contrasted CTH study which showed an increase in the accumulation of fluid since her previous scan on 8/12. Patient was sent to [facility] due to the CT findings coupled with the consistent headache and an episode of near syncope.  A repeat scan was performed at [facility] and showed a stable subdural with no changes from previous outpatient study. She was then flown into [facility] via [facility] as a code trauma B which was cancelled upon arrival. Neurosurgery and NSICU were consulted and patient was admitted to the NSICU for observation overnight. Denies any chest pain orthopnea or PND. EKG NSR LVH none specific changes.  TTE normal EF DDD lVH mild to moderate MR/AI and Carotid US stable.    Plan  1.  DC Norvasc 2.5 mg po daily   2.  Check orthostatic today and consider IVF  3.  Losartan 50 mg po daily for BP control   4.  ILR on 9/2/2021    D/W with patient and daughter who is at bedside       D/W with Dr Frost

## 2021-09-01 NOTE — H&P ADULT - NSICDXPASTSURGICALHX_GEN_ALL_CORE_FT
PAST SURGICAL HISTORY:  S/P cholecystectomy     S/p nephrectomy      PAST SURGICAL HISTORY:  S/P cataract surgery     S/P cholecystectomy     S/p nephrectomy

## 2021-09-01 NOTE — ED ADULT NURSE NOTE - CHIEF COMPLAINT QUOTE
[place] [facility] [place] and abnormal CT findings. C/o headache and nausea. Trauma B activated.

## 2021-09-01 NOTE — H&P ADULT - HISTORY OF PRESENT ILLNESS
88F with recent discharge from [facility] on 8/12 after a mechanical fall resulting in a left sided subdural hematoma. Patient was discharged from [facility] to home with outpatient follow up. Post discharge she has been having recurrent headaches stated to be on the top of her head in which she has been taking tylenol at home and avoiding NSAIDs or any other antiplatelet / anticoag medications. She appeared for an outpatient neurological follow up on 8/31 for non contrasted CTH study. The outpatient CTH showed an increase in the accumulation of fluid since her previous scan on 8/12. She was sent to [facility] due to the CT findings coupled with the consistent headache and an episode of near syncope today. A repeat scan was performed at [facility] and showed a stable subdural with no changes from previous outpatient study. She was then flown into [facility] via [employer] as a code trauma B which was cancelled upon arrival. Neurosurgery and NSICU were consulted and patient was admitted to the NSICU for observation overnight. SBP on arrival was 196 and received 10 of labetalol en route and 10 of hydralazine in the ED.  88F with recent discharge from [facility] on 8/12 after a mechanical fall 8/10/21,  resulting in a left sided subdural hematoma. Patient was discharged from [facility] to home with outpatient follow up. Post discharge she has been having recurrent headaches stated to be on the top of her head in which she has been taking tylenol at home and avoiding NSAIDs or any other antiplatelet / anticoag medications. She appeared for an outpatient neurological follow up on 8/31 for non contrasted CTH study. The outpatient CTH showed an increase in the accumulation of fluid since her previous scan on 8/12. She was sent to [facility] due to the CT findings coupled with the consistent headache and an episode of near syncope today. Denies recent fall or illness.    A repeat scan was performed at [facility] and showed a stable subdural with no changes from previous outpatient study. She was then flown into [facility] via [employer] as a code trauma B which was cancelled upon arrival. Neurosurgery and NSICU were consulted and patient was admitted to the NSICU for observation overnight. SBP on arrival was 196 and received 10 of labetalol en route and 10 of hydralazine in the ED.

## 2021-09-01 NOTE — ED ADULT TRIAGE NOTE - CHIEF COMPLAINT QUOTE
Transfer from [facility] for neurology and abnormal CT findings. C/o headache and nausea. Trauma B activated.

## 2021-09-01 NOTE — H&P ADULT - ASSESSMENT
Assessment: 88F with previous admission for SDH s/p mechanical fall, now presenting to [facility] with chronic L SDH and increased fluid component after an outpatient follow up CT and stability scan at [facility]. Patient complains of headache since previous admission, denies trauma since last admission and has no focal neurological deficits.     Plan  - SBP < 160 -- cardene gtt to achieve goal   - admit to NSICU overnight for q1h neurochecks as she awaits neurosurgical evaluation via attending physician   - Hold any AP/ AC medications   - NPO pending dysphagia and restart home meds + diet after she passes   - CTH from outpatient and [facility] reviewed by neuro team overnight and will be further discussed during morning rounds   - monitor telemetry for any abnormal HR or rhythm   - Pain control as needed   - Further plan to be discussed in morning rounds

## 2021-09-01 NOTE — ED ADULT NURSE NOTE - OBJECTIVE STATEMENT
Pt in no apparent distress at this time. Airway patent, breathing spontaneous and nonlabored. Pt A&Ox3 resting in stretcher. Pt xfer from [facility] for SDH. pt recent bleed x3 weeks ago, had out PT follow up heat CT which showed increase from previous scan.

## 2021-09-01 NOTE — H&P ADULT - NSHPPHYSICALEXAM_GEN_ALL_CORE
PHYSICAL EXAM:  GENERAL: NAD, lying in bed comfortably  HEAD:   Normocephalic, small area of healing laceration from previous visit on left side of forehead   EYES: EOMI, PERRL, conjunctiva and sclera clear  ENT: Moist mucous membranes  NECK: Supple, No JVD  CHEST/LUNG: Clear to auscultation bilaterally; No rales, rhonchi, wheezing, or rubs. Unlabored respirations  HEART: Regular rate and rhythm; No murmurs, rubs, or gallops  ABDOMEN: Soft, Nontender, Nondistended   NERVOUS SYSTEM:  Alert & Oriented X3, speech clear. No facial droop, tongue protrusion midline. Answers questions appropriately. Full and equal 5/5 strength B/L upper and lower extremities. Sensation intact. No deficits   SKIN: No rashes or lesions PHYSICAL EXAM:  GENERAL: NAD, lying in bed comfortably  HEAD:   Normocephalic, small area of healing laceration from previous visit on left side of forehead   EYES: EOMI, PERRL, conjunctiva and sclera clear  ENT: Dry mucous membranes  NECK: Supple, No JVD  CHEST/LUNG: Clear to auscultation bilaterally; No rales, rhonchi, wheezing, or rubs. Unlabored respirations  HEART: Regular rate and rhythm; No murmurs, rubs, or gallops  ABDOMEN: Soft, Nontender, Nondistended   NERVOUS SYSTEM:  Alert & Oriented X3, speech clear.   No facial droop, tongue protrusion midline.   Answers questions appropriately.   Memory intact  perrla, eomi  cranial nerves 2-12 intact   post surgical pupils w iol's, equal/reactive.   Motor 5/5 strength B/L upper and lower extremities.   Sensation intact.   fine motor and coordination intact BLUE, unable heel/shin bilaterally   SKIN: No rashes or lesions

## 2021-09-02 DIAGNOSIS — I48.91 UNSPECIFIED ATRIAL FIBRILLATION: ICD-10-CM

## 2021-09-02 DIAGNOSIS — S06.5X9A TRAUMATIC SUBDURAL HEMORRHAGE WITH LOSS OF CONSCIOUSNESS OF UNSPECIFIED DURATION, INITIAL ENCOUNTER: ICD-10-CM

## 2021-09-02 DIAGNOSIS — I10 ESSENTIAL (PRIMARY) HYPERTENSION: ICD-10-CM

## 2021-09-02 LAB
ANION GAP SERPL CALC-SCNC: 13 MMOL/L — SIGNIFICANT CHANGE UP (ref 5–17)
BASOPHILS # BLD AUTO: 0.04 K/UL — SIGNIFICANT CHANGE UP (ref 0–0.2)
BASOPHILS NFR BLD AUTO: 0.8 % — SIGNIFICANT CHANGE UP (ref 0–2)
BUN SERPL-MCNC: 7.7 MG/DL — LOW (ref 8–20)
CALCIUM SERPL-MCNC: 8.8 MG/DL — SIGNIFICANT CHANGE UP (ref 8.6–10.2)
CHLORIDE SERPL-SCNC: 107 MMOL/L — SIGNIFICANT CHANGE UP (ref 98–107)
CO2 SERPL-SCNC: 23 MMOL/L — SIGNIFICANT CHANGE UP (ref 22–29)
COVID-19 SPIKE DOMAIN AB INTERP: POSITIVE
COVID-19 SPIKE DOMAIN ANTIBODY RESULT: 87.4 U/ML — HIGH
CREAT SERPL-MCNC: 0.82 MG/DL — SIGNIFICANT CHANGE UP (ref 0.5–1.3)
EOSINOPHIL # BLD AUTO: 0.15 K/UL — SIGNIFICANT CHANGE UP (ref 0–0.5)
EOSINOPHIL NFR BLD AUTO: 2.9 % — SIGNIFICANT CHANGE UP (ref 0–6)
GLUCOSE SERPL-MCNC: 103 MG/DL — HIGH (ref 70–99)
HCT VFR BLD CALC: 37.4 % — SIGNIFICANT CHANGE UP (ref 34.5–45)
HGB BLD-MCNC: 11.9 G/DL — SIGNIFICANT CHANGE UP (ref 11.5–15.5)
IMM GRANULOCYTES NFR BLD AUTO: 0.2 % — SIGNIFICANT CHANGE UP (ref 0–1.5)
LYMPHOCYTES # BLD AUTO: 1.23 K/UL — SIGNIFICANT CHANGE UP (ref 1–3.3)
LYMPHOCYTES # BLD AUTO: 23.5 % — SIGNIFICANT CHANGE UP (ref 13–44)
MAGNESIUM SERPL-MCNC: 2.4 MG/DL — SIGNIFICANT CHANGE UP (ref 1.6–2.6)
MCHC RBC-ENTMCNC: 29.5 PG — SIGNIFICANT CHANGE UP (ref 27–34)
MCHC RBC-ENTMCNC: 31.8 GM/DL — LOW (ref 32–36)
MCV RBC AUTO: 92.8 FL — SIGNIFICANT CHANGE UP (ref 80–100)
MONOCYTES # BLD AUTO: 0.41 K/UL — SIGNIFICANT CHANGE UP (ref 0–0.9)
MONOCYTES NFR BLD AUTO: 7.8 % — SIGNIFICANT CHANGE UP (ref 2–14)
NEUTROPHILS # BLD AUTO: 3.4 K/UL — SIGNIFICANT CHANGE UP (ref 1.8–7.4)
NEUTROPHILS NFR BLD AUTO: 64.8 % — SIGNIFICANT CHANGE UP (ref 43–77)
PHOSPHATE SERPL-MCNC: 3.6 MG/DL — SIGNIFICANT CHANGE UP (ref 2.4–4.7)
PLATELET # BLD AUTO: 167 K/UL — SIGNIFICANT CHANGE UP (ref 150–400)
POTASSIUM SERPL-MCNC: 3.8 MMOL/L — SIGNIFICANT CHANGE UP (ref 3.5–5.3)
POTASSIUM SERPL-SCNC: 3.8 MMOL/L — SIGNIFICANT CHANGE UP (ref 3.5–5.3)
RBC # BLD: 4.03 M/UL — SIGNIFICANT CHANGE UP (ref 3.8–5.2)
RBC # FLD: 14.6 % — HIGH (ref 10.3–14.5)
SARS-COV-2 IGG+IGM SERPL QL IA: 87.4 U/ML — HIGH
SARS-COV-2 IGG+IGM SERPL QL IA: POSITIVE
SODIUM SERPL-SCNC: 143 MMOL/L — SIGNIFICANT CHANGE UP (ref 135–145)
TSH SERPL-MCNC: 3.16 UIU/ML — SIGNIFICANT CHANGE UP (ref 0.27–4.2)
WBC # BLD: 5.24 K/UL — SIGNIFICANT CHANGE UP (ref 3.8–10.5)
WBC # FLD AUTO: 5.24 K/UL — SIGNIFICANT CHANGE UP (ref 3.8–10.5)

## 2021-09-02 PROCEDURE — 99232 SBSQ HOSP IP/OBS MODERATE 35: CPT

## 2021-09-02 PROCEDURE — 99233 SBSQ HOSP IP/OBS HIGH 50: CPT

## 2021-09-02 RX ORDER — LANOLIN ALCOHOL/MO/W.PET/CERES
3 CREAM (GRAM) TOPICAL AT BEDTIME
Refills: 0 | Status: DISCONTINUED | OUTPATIENT
Start: 2021-09-02 | End: 2021-09-03

## 2021-09-02 RX ORDER — ACETAMINOPHEN 500 MG
650 TABLET ORAL ONCE
Refills: 0 | Status: COMPLETED | OUTPATIENT
Start: 2021-09-02 | End: 2021-09-02

## 2021-09-02 RX ADMIN — RALOXIFENE HYDROCHLORIDE 60 MILLIGRAM(S): 60 TABLET, COATED ORAL at 17:46

## 2021-09-02 RX ADMIN — PANTOPRAZOLE SODIUM 40 MILLIGRAM(S): 20 TABLET, DELAYED RELEASE ORAL at 05:09

## 2021-09-02 RX ADMIN — Medication 650 MILLIGRAM(S): at 01:51

## 2021-09-02 RX ADMIN — Medication 650 MILLIGRAM(S): at 02:53

## 2021-09-02 RX ADMIN — Medication 25 MILLIGRAM(S): at 17:45

## 2021-09-02 RX ADMIN — Medication 25 MILLIGRAM(S): at 12:55

## 2021-09-02 RX ADMIN — ATORVASTATIN CALCIUM 40 MILLIGRAM(S): 80 TABLET, FILM COATED ORAL at 22:56

## 2021-09-02 RX ADMIN — Medication 3 MILLIGRAM(S): at 22:57

## 2021-09-02 RX ADMIN — SODIUM CHLORIDE 75 MILLILITER(S): 9 INJECTION, SOLUTION INTRAVENOUS at 12:55

## 2021-09-02 RX ADMIN — Medication 25 MILLIGRAM(S): at 22:58

## 2021-09-02 NOTE — OCCUPATIONAL THERAPY INITIAL EVALUATION ADULT - PERTINENT HX OF CURRENT PROBLEM, REHAB EVAL
88F with previous admission for SDH s/p mechanical fall, now presenting to [facility] with chronic L SDH and increased fluid component after an outpatient follow up CT and stability [place] [facility] Patient complains of headache since previous admission, denies trauma since last admission and has no focal neurological deficits. Developed rapid afib 9/1, now in normal sinus rythm

## 2021-09-02 NOTE — PROGRESS NOTE ADULT - ASSESSMENT
Patient seen and examined at bedside. No acute events overnight. Daughter Ruthie at bedside. Patient states she is feeling better, headache intermittently recurs, frontal pressure like in character, 7/10. Associated intermittent photophobia at times. Pt reported that she was laying in bed and got up to try and go to the bathroom and she began feeling dizzy, and the sensation of feeling as if she was going to pass out. Pt reports she has been intermittently feeling this way on and off, usually when moving from positions. Aside from this the pt denies any sx of sob, chest pain or prior episodes of LOC. Patient developed rapid atrial fib 9/1    88 y.o. F with hx HTN and osteoporosis, recently discharged from [facility] on 8/12 after a mechanical fall 8/10/21, resulting in a left sided subdural hematoma. Patient was discharged from [facility] to home with outpatient follow up. Post discharge pt reported recurrent headaches controlled on tylenol, she followed up outpt neurology and had repeat ct head on 8/31 for non contrasted CTH study which showed an increase in the accumulation of fluid since her previous scan on 8/12. Pt was sent to [facility] due to the CT findings coupled with the consistent headache and an episode of near syncope.  A repeat scan was performed at [facility] and showed a stable subdural with no changes from previous outpatient study. She was then flown into [facility] via [employer] as a code trauma B which was cancelled upon arrival. Neurosurgery and NSICU were consulted and patient was admitted to the NSICU for observation overnight. HTN urgency noted on admission with SBP on arrival which was controlled with IV and po bp meds. Neurologically pt remained stable. Neuro ICU downgraded the pt to the medicine team to further manage the pre syncope. She developed rapid atrial fib new metoprolol started cardio consu;clotilde    88 y.o. F with hx HTN and osteoporosis, recently discharged from [facility] on 8/12 after a mechanical fall 8/10/21, resulting in a left sided subdural hematoma. Patient was discharged from [facility] to home with outpatient follow up. Post discharge pt reported recurrent headaches controlled on tylenol, she followed up outpt neurology and had repeat ct head on 8/31 for non contrasted CTH study which showed an increase in the accumulation of fluid since her previous scan on 8/12. Pt was sent to [facility] due to the CT findings coupled with the consistent headache and an episode of near syncope.  A repeat [place] was performed [place] [facility] and showed a stable subdural with no changes from previous outpatient study. She was then flown into [facility] via [employer] as a code trauma B which was cancelled upon arrival. Neurosurgery and NSICU were consulted and patient was admitted to the NSICU for observation overnight. HTN urgency noted on admission with SBP on arrival which was controlled with IV and po bp meds. Neurologically pt remained stable. Neuro ICU downgraded the pt to the medicine team to further manage the pre syncope. She developed rapid atrial fib new metoprolol started cardio consulted

## 2021-09-02 NOTE — PROGRESS NOTE ADULT - SUBJECTIVE AND OBJECTIVE BOX
[facility], THE HEART CENTER                              [address] [address]                                                 PHONE: [phone]                                                 FAX: [phone]  -----------------------------------------------------------------------------------------------  Pt seen and examined. FU for fall    Overnight events/Complaints: Pt without complains. Episode of AF now back in NSR [ECG pending]    Vital Signs Last 24 Hrs  T(C): 37.1 (02 Sep 2021 10:12), Max: 37.1 (02 Sep 2021 10:12)  T(F): 98.7 (02 Sep 2021 10:12), Max: 98.7 (02 Sep 2021 10:12)  HR: 61 (02 Sep 2021 10:12) (60 - 160)  BP: 144/70 (02 Sep 2021 10:12) (109/70 - 159/78)  BP(mean): 112 (01 Sep 2021 11:17) (112 - 112)  RR: 18 (02 Sep 2021 10:12) (16 - 22)  SpO2: 94% (02 Sep 2021 10:12) (94% - 98%)  I&O's Summary    01 Sep 2021 07:01  -  02 Sep 2021 07:00  --------------------------------------------------------  IN: 100 mL / OUT: 250 mL / NET: -150 mL    PHYSICAL EXAM:  Constitutional: Appears well; alert and co-operative  HEENT:     Head: Normocephalic and atraumatic  Neck: supple. No JVD  Cardiovascular: regular S1 S2  Respiratory: Lungs clear to auscultation; no crepitations, no wheeze  Gastrointestinal:  Soft, Non-tender, + BS	  Musculoskeletal: Normal range of motion. No edema  Skin: Warm and dry. No cyanosis . No diaphoresis.  Neurologic: Alert oriented to time place and person. Normal strength and no tremor.        LABS:                        11.9   5.24  )-----------( 167      ( 02 Sep 2021 07:52 )             37.4     09-02    143  |  107  |  7.7<L>  ----------------------------<  103<H>  3.8   |  23.0  |  0.82    Ca    8.8      02 Sep 2021 07:52  Phos  3.6     09-02  Mg     2.4     09-02    TPro  6.9  /  Alb  4.3  /  TBili  0.3<L>  /  DBili  x   /  AST  14  /  ALT  8   /  AlkPhos  70  09-01        PT/INR - ( 01 Sep 2021 02:21 )   PT: 11.7 sec;   INR: 1.01 ratio         PTT - ( 01 Sep 2021 02:21 )  PTT:36.6 sec    CARDIOLOGY TESTING:(reviewed)     12 lead EKG independently visualized/reviewed  and demonstrated NSR LVH none specific changes     ECHO:  < from: TTE Echo Complete w/ Contrast w/ Doppler (08.10.21 @ 21:04) >  Summary:   1. Normal global left ventricular systolic function.   2. Left ventricular ejection fraction, by visual estimation, is 65 to 70%.   3. Mildly increased posterior wall thickness. Moderately increased septal wall thickness.   4. Normal right ventricular size and function.   5. The left atrium is normal in size.   6. The right atrium is normal in size.   7. Mild thickening of the anterior and posterior mitral valve leaflets.   8. Moderate mitral annular calcification.   9. Mild to moderate mitral valve regurgitation.  10. Mild to moderate aortic regurgitation.  11. No aortic valve stenosis.  12. Trivial pericardial effusion.  13. Recommend clinical correlation with the above findings.    MEDICATIONS:(reviewed)  MEDICATIONS  (STANDING):  atorvastatin 40 milliGRAM(s) Oral at bedtime  metoprolol tartrate 25 milliGRAM(s) Oral every 6 hours  multiple electrolytes Injection Type 1 1000 milliLiter(s) (75 mL/Hr) IV Continuous <Continuous>  pantoprazole    Tablet 40 milliGRAM(s) Oral before breakfast  raloxifene 60 milliGRAM(s) Oral daily  senna 1 Tablet(s) Oral daily      ASSESSMENT AND PLAN:    88y Female with past medical history significant for HTN who was recently discharged from [facility] on 8/12 after a mechanical fall vs syncope 8/10/21, resulting in a left sided subdural hematoma.  The patient now states that her first episodes was due to syncope. Patient was discharged from [facility] to home with outpatient follow up. Post discharge, patient states that she has recurrent headaches controlled on Tylenol was is being followed by neurology.  Patient had repeat CT head on 8/31 for non contrasted CTH study which showed an increase in the accumulation of fluid since her previous scan on 8/12. Patient was sent to [facility] due to the CT findings coupled with the consistent headache and an episode of near syncope.  A repeat scan was performed at [facility] and showed a stable subdural with no changes from previous outpatient study. She was then flown into [facility] via [employer] as a code trauma B which was cancelled upon arrival. Neurosurgery and NSICU were consulted and patient was admitted to the NSICU for observation overnight. Denies any chest pain orthopnea or PND. EKG NSR LVH none specific changes.  TTE normal EF DDD lVH mild to moderate MR/AI and Carotid US stable.    1.  PAF now in NSR  2.  Continue statin with metoprolol  3.  ILR on 9/3/2021 prior to d/c    d/w Dr. Kaur

## 2021-09-02 NOTE — PROGRESS NOTE ADULT - SUBJECTIVE AND OBJECTIVE BOX
HPI:  88F with recent discharge from [facility] on 8/12 after a mechanical fall 8/10/21,  resulting in a left sided subdural hematoma. Patient was discharged from [facility] to home with outpatient follow up. Post discharge she has been having recurrent headaches stated to be on the top of her head in which she has been taking tylenol at home and avoiding NSAIDs or any other antiplatelet / anticoag medications. She appeared for an outpatient neurological follow up on 8/31 for non contrasted CTH study. The outpatient CTH showed an increase in the accumulation of fluid since her previous scan on 8/12. She was sent to [facility] due to the CT findings coupled with the consistent headache and an episode of near syncope today. Denies recent fall or illness.    A repeat scan was performed at [facility] and showed a stable subdural with no changes from previous outpatient study. She was then flown into [facility] via [employer] as a code trauma B which was cancelled upon arrival. Neurosurgery and NSICU were consulted and patient was admitted to the NSICU for observation overnight. SBP on arrival was 196 and received 10 of labetalol en route and 10 of hydralazine in the ED.  (01 Sep 2021 02:53)    INTERVAL HPI/OVERNIGHT EVENTS:  88y Female seen and examined by neurosurgery team, lying comfortably in bed. Complains of mild headache. Downgraded to medical floor yesterday 9/1.     Vital Signs Last 24 Hrs  T(C): 37.1 (02 Sep 2021 10:12), Max: 37.1 (02 Sep 2021 10:12)  T(F): 98.7 (02 Sep 2021 10:12), Max: 98.7 (02 Sep 2021 10:12)  HR: 61 (02 Sep 2021 10:12) (60 - 160)  BP: 144/70 (02 Sep 2021 10:12) (109/70 - 148/75)  RR: 18 (02 Sep 2021 10:12) (16 - 18)  SpO2: 94% (02 Sep 2021 10:12) (94% - 97%)    PHYSICAL EXAM:  GENERAL: NAD, well-groomed, well-developed  HEAD: Atraumatic, normocephalic, small area of healing laceration from previous admission on L forehead   DARREN COMA SCORE: E-4 V-5 M-6 = 15  MENTAL STATUS: AAO x3; Awake; Opens eyes spontaneously; Appropriately conversant without aphasia; Following commands   CRANIAL NERVES: PERRL. EOMI without nystagmus. Facial sensation intact V1-3 distribution b/l. Face symmetric w/ normal eye closure and smile, tongue midline. Hearing grossly intact. Speech clear  MOTOR: strength 5/5 b/l upper and lower extremities  SENSATION: grossly intact to light touch all extremities  CHEST/LUNG: Nonlabored breathing, no signs of respiratory distress   HEART: +S1/+S2; Regular rate and rhythm  ABDOMEN: Soft, nontender, nondistended  EXTREMITIES: No calf tenderness b/l  SKIN: Warm, dry      LABS:                        11.9   5.24  )-----------( 167      ( 02 Sep 2021 07:52 )             37.4     09-02    143  |  107  |  7.7<L>  ----------------------------<  103<H>  3.8   |  23.0  |  0.82    Ca    8.8      02 Sep 2021 07:52  Phos  3.6     09-02  Mg     2.4     09-02    TPro  6.9  /  Alb  4.3  /  TBili  0.3<L>  /  DBili  x   /  AST  14  /  ALT  8   /  AlkPhos  70  09-01    PT/INR - ( 01 Sep 2021 02:21 )   PT: 11.7 sec;   INR: 1.01 ratio         PTT - ( 01 Sep 2021 02:21 )  PTT:36.6 sec      09-01 @ 07:01  -  09-02 @ 07:00  --------------------------------------------------------  IN: 100 mL / OUT: 250 mL / NET: -150 mL        RADIOLOGY & ADDITIONAL TESTS:  8/31/21 CTH from [facility]:  IMPRESSION:  Left frontoparietal mixed attenuation subdural hematoma containing acute and subacute components resulting in 3 mm of left to right midline shift, not significantly changed compared with the examination from earlier the same day. Acute subdural hemorrhage layering on the left tentorium, not significantly changed compared with the examination from earlier the same day.    8/31/21 CTH:  IMPRESSION:  Normal evolution of a left frontal parietal subacute subdural hematoma compared with 8/12/2021. The collection is now low density but is slightly larger with slightly increased mass effect on the adjacent cerebral cortical sulci. No new hemorrhage.    CT Head No Cont (08.12.21 @ 15:06):  FINDINGS:  There is mild diffuse parenchymal volume loss. There are areas of low attenuation in the periventricular white matter likely related to moderate chronic microvascular ischemic changes.  Small left acute left subdural hematoma without significant change in the left temporal region but slightly smaller in the left parietal region on the coronal plane measuring 6 mm in maximum width, previously measuring 7 mm.  There is no midline shift. There is no acute territorial infarct. There is no hydrocephalus.  The cranium is intact. Left frontal soft tissue swelling/hematoma. The visualized paranasal sinuses are well-aerated.  IMPRESSION:  Left subdural hematoma as described above    CT Head No Cont (08.11.21 @ 23:04):  IMPRESSION:  Left temporal and parietal convexity subdural hematoma with new component of subdural hematoma over the left parietal convexity. Maximum thickness of the subdural hematoma or the left parietal convexity 7 mm. No significant mass effect or shift of the midline.    US Duplex Carotid Arteries Complete, Bilateral (08.11.21 @ 17:07):  IMPRESSION: Mild plaque in the carotid bulbs with no evidence of a hemodynamically significant stenosis in either internal carotid artery.  Measurement of carotid stenosis is based on velocity parameters that correlate the residual internal carotid diameter with that of the more distal vessel in accordance with a method such as the North American Symptomatic Carotid Endarterectomy Trial (NASCET).   HPI:  88F with recent discharge from [facility] on 8/12 after a mechanical fall 8/10/21,  resulting in a left sided subdural hematoma. Patient was discharged from [facility] to home with outpatient follow up. Post discharge she has been having recurrent headaches stated to be on the top of her head in which she has been taking tylenol at home and avoiding NSAIDs or any other antiplatelet / anticoag medications. She appeared for an outpatient neurological follow up on 8/31 for non contrasted CTH study. The outpatient CTH showed an increase in the accumulation of fluid since her previous scan on 8/12. She was sent to [facility] due to the CT findings coupled with the consistent headache and an episode of near syncope today. Denies recent fall or illness.    A repeat scan was performed at [facility] and showed a stable subdural with no changes from previous outpatient study. She was then flown into [facility] via [employer] as a code trauma B which was cancelled upon arrival. Neurosurgery and NSICU were consulted and patient was admitted to the NSICU for observation overnight. SBP on arrival was 196 and received 10 of labetalol en route and 10 of hydralazine in the ED.  (01 Sep 2021 02:53)    INTERVAL HPI/OVERNIGHT EVENTS:  88y Female seen and examined by neurosurgery team, lying comfortably in bed. Reports headache has resolved and she is feeling well. Downgraded to medical floor yesterday 9/1.     Vital Signs Last 24 Hrs  T(C): 37.1 (02 Sep 2021 10:12), Max: 37.1 (02 Sep 2021 10:12)  T(F): 98.7 (02 Sep 2021 10:12), Max: 98.7 (02 Sep 2021 10:12)  HR: 61 (02 Sep 2021 10:12) (60 - 160)  BP: 144/70 (02 Sep 2021 10:12) (109/70 - 148/75)  RR: 18 (02 Sep 2021 10:12) (16 - 18)  SpO2: 94% (02 Sep 2021 10:12) (94% - 97%)    PHYSICAL EXAM:  GENERAL: NAD, well-groomed, well-developed  HEAD: Atraumatic, normocephalic  DARREN COMA SCORE: E-4 V-5 M-6 = 15  MENTAL STATUS: AAO x3; Awake; Opens eyes spontaneously; Appropriately conversant without aphasia; Following commands   CRANIAL NERVES: PERRL. EOMI without nystagmus. Facial sensation intact V1-3 distribution b/l. Face symmetric w/ normal eye closure and smile, tongue midline. Hearing grossly intact. Speech clear  MOTOR: strength 5/5 b/l upper and lower extremities  SENSATION: grossly intact to light touch all extremities  CHEST/LUNG: Nonlabored breathing, no signs of respiratory distress   HEART: +S1/+S2; Regular rate and rhythm  ABDOMEN: Soft, nontender, nondistended  EXTREMITIES: No calf tenderness b/l  SKIN: Warm, dry      LABS:                        11.9   5.24  )-----------( 167      ( 02 Sep 2021 07:52 )             37.4     09-02    143  |  107  |  7.7<L>  ----------------------------<  103<H>  3.8   |  23.0  |  0.82    Ca    8.8      02 Sep 2021 07:52  Phos  3.6     09-02  Mg     2.4     09-02    TPro  6.9  /  Alb  4.3  /  TBili  0.3<L>  /  DBili  x   /  AST  14  /  ALT  8   /  AlkPhos  70  09-01    PT/INR - ( 01 Sep 2021 02:21 )   PT: 11.7 sec;   INR: 1.01 ratio         PTT - ( 01 Sep 2021 02:21 )  PTT:36.6 sec      09-01 @ 07:01  -  09-02 @ 07:00  --------------------------------------------------------  IN: 100 mL / OUT: 250 mL / NET: -150 mL        RADIOLOGY & ADDITIONAL TESTS:  8/31/21 CTH from [facility]:  IMPRESSION:  Left frontoparietal mixed attenuation subdural hematoma containing acute and subacute components resulting in 3 mm of left to right midline shift, not significantly changed compared with the examination from earlier the same day. Acute subdural hemorrhage layering on the left tentorium, not significantly changed compared with the examination from earlier the same day.    8/31/21 CTH:  IMPRESSION:  Normal evolution of a left frontal parietal subacute subdural hematoma compared with 8/12/2021. The collection is now low density but is slightly larger with slightly increased mass effect on the adjacent cerebral cortical sulci. No new hemorrhage.    CT Head No Cont (08.12.21 @ 15:06):  FINDINGS:  There is mild diffuse parenchymal volume loss. There are areas of low attenuation in the periventricular white matter likely related to moderate chronic microvascular ischemic changes.  Small left acute left subdural hematoma without significant change in the left temporal region but slightly smaller in the left parietal region on the coronal plane measuring 6 mm in maximum width, previously measuring 7 mm.  There is no midline shift. There is no acute territorial infarct. There is no hydrocephalus.  The cranium is intact. Left frontal soft tissue swelling/hematoma. The visualized paranasal sinuses are well-aerated.  IMPRESSION:  Left subdural hematoma as described above    CT Head No Cont (08.11.21 @ 23:04):  IMPRESSION:  Left temporal and parietal convexity subdural hematoma with new component of subdural hematoma over the left parietal convexity. Maximum thickness of the subdural hematoma or the left parietal convexity 7 mm. No significant mass effect or shift of the midline.    US Duplex Carotid Arteries Complete, Bilateral (08.11.21 @ 17:07):  IMPRESSION: Mild plaque in the carotid bulbs with no evidence of a hemodynamically significant stenosis in either internal carotid artery.  Measurement of carotid stenosis is based on velocity parameters that correlate the residual internal carotid diameter with that of the more distal vessel in accordance with a method such as the North American Symptomatic Carotid Endarterectomy Trial (NASCET).

## 2021-09-02 NOTE — OCCUPATIONAL THERAPY INITIAL EVALUATION ADULT - GENERAL OBSERVATIONS, REHAB EVAL
pt received in bed and left in bedside chair, CBWR and daughter present, +Tele + no c/o pain, c/o dizziness with movement

## 2021-09-02 NOTE — PROGRESS NOTE ADULT - SUBJECTIVE AND OBJECTIVE BOX
Patient is a 88y old  Female who presents with a chief complaint of Transfer from [facility] for recurrent HA in the setting of chronic subdural hematoma  developed rapid atrial fib yesterday new dx   she is seen in am , resting , c/o some HA today     no dizziness , chart reviewed  d/w Dr Alarcon cardiologist       Patient seen and examined at bedside. No overnight events reported.     ALLERGIES:  codeine (Other)  penicillin (Hives)    MEDICATIONS  (STANDING):  atorvastatin 40 milliGRAM(s) Oral at bedtime  metoprolol tartrate 25 milliGRAM(s) Oral every 6 hours  multiple electrolytes Injection Type 1 1000 milliLiter(s) (75 mL/Hr) IV Continuous <Continuous>  pantoprazole    Tablet 40 milliGRAM(s) Oral before breakfast  raloxifene 60 milliGRAM(s) Oral daily  senna 1 Tablet(s) Oral daily    MEDICATIONS  (PRN):  hydrALAZINE Injectable 10 milliGRAM(s) IV Push every 2 hours PRN SBP>160    Vital Signs Last 24 Hrs  T(F): 98.7 (02 Sep 2021 10:12), Max: 98.7 (02 Sep 2021 10:12)  HR: 61 (02 Sep 2021 10:12) (60 - 160)  BP: 144/70 (02 Sep 2021 10:12) (109/70 - 171/85)  RR: 18 (02 Sep 2021 10:12) (16 - 22)  SpO2: 94% (02 Sep 2021 10:12) (94% - 98%)  I&O's Summary    01 Sep 2021 07:01  -  02 Sep 2021 07:00  --------------------------------------------------------  IN: 100 mL / OUT: 250 mL / NET: -150 mL        PHYSICAL EXAM:  General: awake alert , no distress   Neck: supple, no JVD   Lungs: CTA bilateral , no rales anteriorly   Cardio: RRR, S1/S2, No murmur  Abdomen: Soft, Nontender, Nondistended; Bowel sounds present  Extremities: No calf tenderness, No pitting edema    LABS:                        11.9   5.24  )-----------( 167      ( 02 Sep 2021 07:52 )             37.4     09-02    143  |  107  |  7.7  ----------------------------<  103  3.8   |  23.0  |  0.82    Ca    8.8      02 Sep 2021 07:52  Phos  3.6     09-02  Mg     2.4     09-02    TPro  6.9  /  Alb  4.3  /  TBili  0.3  /  DBili  x   /  AST  14  /  ALT  8   /  AlkPhos  70  09-01        eGFR if Non African American: 64 mL/min/1.73M2 (09-02-21 @ 07:52)  eGFR if : 74 mL/min/1.73M2 (09-02-21 @ 07:52)    PT/INR - ( 01 Sep 2021 02:21 )   PT: 11.7 sec;   INR: 1.01 ratio         PTT - ( 01 Sep 2021 02:21 )  PTT:36.6 sec          08-11 Chol 193 mg/dL LDL -- HDL 64 mg/dL Trig 86 mg/dL  TSH 3.16   TSH with FT4 reflex --  Total T3 --                      COVID-19 PCR: NotDetec (09-01-21 @ 04:12)    RADIOLOGY & ADDITIONAL TESTS:

## 2021-09-02 NOTE — PROGRESS NOTE ADULT - ASSESSMENT
88F with previous admission for SDH s/p mechanical fall, now presenting to [facility] with chronic L SDH and increased fluid component after an outpatient follow up CT and stability scan [place] [facility]. Patient complains of headache since previous admission, denies trauma since last admission and has no focal neurological deficits. Developed rapid afib 9/1, now in NSR.     Plan:  -D/w attending neurosurgeon Dr. Garcia  -Patient remains neurologically intact  -All imaging reviewed with attendings   -Q4 hour neuro checks   -STAT CTH for any decline in mental status  -No acute neurosurgical intervention indicated at this time   -Follow outpatient with Dr. Garcia in 2 weeks with a new CT head   -SBP goal 100-160  -Metoprolol 25mg Q6hrs  -Lipitor 40mg daily   -Hydralazine PRN  -Cardiology following - recommending ILR on 9/3/2021 prior to d/c  -Saturating well on room air   -Maintain SpO2>92%  -Regular diet   -Protonix 40mg daily   -Senna   -Voiding   -SCDs b/l for DVT prophylaxis   -Raloxifene 60mg daily   -PT/OT  -Neurosurgery signing off, please reconsult PRN 88F with previous admission for SDH s/p mechanical fall, now presenting to [facility] with chronic L SDH and increased fluid component after an outpatient follow up CT and stability scan at [facility]. Patient complains of headache since previous admission, denies trauma since last admission and has no focal neurological deficits. Developed rapid afib 9/1, now in NSR.     Plan:  -D/w attending neurosurgeon Dr. Garcia  -Patient remains neurologically intact  -All imaging reviewed with attendings   -Q4 hour neuro checks   -STAT CTH for any decline in mental status  -No acute neurosurgical intervention indicated at this time   -Follow outpatient with Dr. Garcia in 2 weeks with a new CT head   -SBP goal 100-160  -Metoprolol 25mg Q6hrs  -Lipitor 40mg daily   -Hydralazine PRN  -Cardiology following - recommending ILR on 9/3/2021 prior to d/c  -Saturating well on room air   -Maintain SpO2>92%  -Regular diet   -Protonix 40mg daily   -Senna   -Voiding   -SCDs b/l for DVT prophylaxis   -Raloxifene 60mg daily   -PT/OT  -Neurosurgery signing off, please reconsult PRN

## 2021-09-03 ENCOUNTER — TRANSCRIPTION ENCOUNTER (OUTPATIENT)
Age: 86
End: 2021-09-03

## 2021-09-03 VITALS — DIASTOLIC BLOOD PRESSURE: 82 MMHG | HEART RATE: 68 BPM | SYSTOLIC BLOOD PRESSURE: 134 MMHG

## 2021-09-03 PROCEDURE — 36415 COLL VENOUS BLD VENIPUNCTURE: CPT

## 2021-09-03 PROCEDURE — U0003: CPT

## 2021-09-03 PROCEDURE — 86901 BLOOD TYPING SEROLOGIC RH(D): CPT

## 2021-09-03 PROCEDURE — 97167 OT EVAL HIGH COMPLEX 60 MIN: CPT

## 2021-09-03 PROCEDURE — 80048 BASIC METABOLIC PNL TOTAL CA: CPT

## 2021-09-03 PROCEDURE — C1764: CPT

## 2021-09-03 PROCEDURE — 83735 ASSAY OF MAGNESIUM: CPT

## 2021-09-03 PROCEDURE — U0005: CPT

## 2021-09-03 PROCEDURE — 99239 HOSP IP/OBS DSCHRG MGMT >30: CPT

## 2021-09-03 PROCEDURE — 93005 ELECTROCARDIOGRAM TRACING: CPT

## 2021-09-03 PROCEDURE — 86769 SARS-COV-2 COVID-19 ANTIBODY: CPT

## 2021-09-03 PROCEDURE — 85610 PROTHROMBIN TIME: CPT

## 2021-09-03 PROCEDURE — 80053 COMPREHEN METABOLIC PANEL: CPT

## 2021-09-03 PROCEDURE — 85025 COMPLETE CBC W/AUTO DIFF WBC: CPT

## 2021-09-03 PROCEDURE — 86900 BLOOD TYPING SEROLOGIC ABO: CPT

## 2021-09-03 PROCEDURE — 84443 ASSAY THYROID STIM HORMONE: CPT

## 2021-09-03 PROCEDURE — 33285 INSJ SUBQ CAR RHYTHM MNTR: CPT

## 2021-09-03 PROCEDURE — 84100 ASSAY OF PHOSPHORUS: CPT

## 2021-09-03 PROCEDURE — 85730 THROMBOPLASTIN TIME PARTIAL: CPT

## 2021-09-03 PROCEDURE — 86850 RBC ANTIBODY SCREEN: CPT

## 2021-09-03 RX ORDER — AMLODIPINE BESYLATE 2.5 MG/1
1 TABLET ORAL
Qty: 30 | Refills: 0
Start: 2021-09-03 | End: 2021-10-02

## 2021-09-03 RX ORDER — LISINOPRIL 2.5 MG/1
5 TABLET ORAL DAILY
Refills: 0 | Status: DISCONTINUED | OUTPATIENT
Start: 2021-09-03 | End: 2021-09-03

## 2021-09-03 RX ORDER — METOPROLOL TARTRATE 50 MG
1 TABLET ORAL
Qty: 30 | Refills: 0
Start: 2021-09-03 | End: 2021-10-02

## 2021-09-03 RX ORDER — CEFAZOLIN SODIUM 1 G
2000 VIAL (EA) INJECTION ONCE
Refills: 0 | Status: COMPLETED | OUTPATIENT
Start: 2021-09-03 | End: 2021-09-03

## 2021-09-03 RX ORDER — SENNA PLUS 8.6 MG/1
1 TABLET ORAL
Qty: 0 | Refills: 0 | DISCHARGE
Start: 2021-09-03

## 2021-09-03 RX ORDER — AMLODIPINE BESYLATE 2.5 MG/1
5 TABLET ORAL DAILY
Refills: 0 | Status: DISCONTINUED | OUTPATIENT
Start: 2021-09-03 | End: 2021-09-03

## 2021-09-03 RX ORDER — NITROFURANTOIN MACROCRYSTAL 50 MG
1 CAPSULE ORAL
Qty: 0 | Refills: 0 | DISCHARGE

## 2021-09-03 RX ORDER — METOPROLOL TARTRATE 50 MG
50 TABLET ORAL DAILY
Refills: 0 | Status: DISCONTINUED | OUTPATIENT
Start: 2021-09-03 | End: 2021-09-03

## 2021-09-03 RX ORDER — LANOLIN ALCOHOL/MO/W.PET/CERES
1 CREAM (GRAM) TOPICAL
Qty: 0 | Refills: 0 | DISCHARGE
Start: 2021-09-03

## 2021-09-03 RX ORDER — OMEPRAZOLE 10 MG/1
1 CAPSULE, DELAYED RELEASE ORAL
Qty: 0 | Refills: 0 | DISCHARGE

## 2021-09-03 RX ADMIN — Medication 50 MILLIGRAM(S): at 12:51

## 2021-09-03 RX ADMIN — RALOXIFENE HYDROCHLORIDE 60 MILLIGRAM(S): 60 TABLET, COATED ORAL at 12:51

## 2021-09-03 RX ADMIN — SENNA PLUS 1 TABLET(S): 8.6 TABLET ORAL at 12:51

## 2021-09-03 RX ADMIN — Medication 100 MILLIGRAM(S): at 11:19

## 2021-09-03 RX ADMIN — Medication 25 MILLIGRAM(S): at 05:56

## 2021-09-03 RX ADMIN — PANTOPRAZOLE SODIUM 40 MILLIGRAM(S): 20 TABLET, DELAYED RELEASE ORAL at 05:56

## 2021-09-03 RX ADMIN — AMLODIPINE BESYLATE 5 MILLIGRAM(S): 2.5 TABLET ORAL at 16:58

## 2021-09-03 RX ADMIN — LISINOPRIL 5 MILLIGRAM(S): 2.5 TABLET ORAL at 12:52

## 2021-09-03 NOTE — PROGRESS NOTE ADULT - REASON FOR ADMISSION
Transfer from [facility] for recurrent HA in the setting of chronic subdural hematoma
Self

## 2021-09-03 NOTE — DISCHARGE NOTE PROVIDER - NSDCCPTREATMENT_GEN_ALL_CORE_FT
PRINCIPAL PROCEDURE  Procedure: Insertion, loop recorder  Findings and Treatment: Loop Recorder Incision Care:     - Do not touch the incision until it is completely healed.   - There is Steristrips on your incision, which will start to flake off on its own over the next 2-3 weeks. Do not pick at or peel off the Steristrips.   - Do not apply soaps, creams, lotions, ointments or powders to the incision until it is completely healed.  - You should call the doctor if you notice redness, drainage, swelling, increased tenderness, hot sensation around the  incision, bleeding or incision edges pulling apart.

## 2021-09-03 NOTE — DISCHARGE NOTE PROVIDER - CARE PROVIDER_API CALL
Francois Holt (MD)  Cardiology; Internal Medicine  [address] [place] [address]  Phone: [phone]  Follow Up Time:    Francois Holt (MD)  Cardiology; Internal Medicine  [address] [place] [address]  Phone: [phone]  Follow Up Time:     Keenan Garcia; PhD)  Neurosurgery  [address] [place] [id number]  Phone: [phone]  Fax: [phone]  Follow Up Time: 2 weeks

## 2021-09-03 NOTE — PROGRESS NOTE ADULT - SUBJECTIVE AND OBJECTIVE BOX
Procedure: [employer] Loop Recorder Implant   Electrophysiologist: Anthony Holt MD     Pt doing well s/p loop recorder implant. Denies complaint.     Incision: Steristrips and Opsite dressing C/D/I; no bleeding, hematoma, erythema, exudate or edema    Plan:   Resume PO intake.   Ambulate w/ assist, then progress as tolerated.     Pain control with PO analgesia PRN.   Outpatient follow up with [facility]

## 2021-09-03 NOTE — PROGRESS NOTE ADULT - ASSESSMENT
Patient seen and examined at bedside. No acute events overnight. Daughter Ruthie at bedside. Patient states she is feeling better, headache intermittently recurs, frontal pressure like in character, 7/10. Associated intermittent photophobia at times. Pt reported that she was laying in bed and got up to try and go to the bathroom and she began feeling dizzy, and the sensation of feeling as if she was going to pass out. Pt reports she has been intermittently feeling this way on and off, usually when moving from positions. Aside from this the pt denies any sx of sob, chest pain or prior episodes of LOC. Patient developed rapid atrial fib 9/1 , now in NSR , ILR implantation prior discharge    8 y.o. F with hx HTN and osteoporosis, recently discharged from [facility] on 8/12 after a mechanical fall 8/10/21, resulting in a left sided subdural hematoma. Patient was discharged from [facility] to home with outpatient follow up. Post discharge pt reported recurrent headaches controlled on tylenol, she followed up outpt neurology and had repeat ct head on 8/31 for non contrasted CTH study which showed an increase in the accumulation of fluid since her previous scan on 8/12. Pt was sent to [facility] due to the CT findings coupled with the consistent headache and an episode of near syncope.  A repeat scan was performed at [facility] and showed a stable subdural with no changes from previous outpatient study. She was then flown into [facility] via [employer] as a code trauma B which was cancelled upon arrival. Neurosurgery and NSICU were consulted and patient was admitted to the NSICU for observation overnight. HTN urgency noted on admission with SBP on arrival which was controlled with IV and po bp meds. Neurologically pt remained stable. Neuro ICU downgraded the pt to the medicine team to further manage the pre syncope. She developed rapid atrial fib new metoprolol started cardio consult called , no [place] NSR   s/p ILR   spoke to the daughter in [facility] re plan and discharge

## 2021-09-03 NOTE — PROGRESS NOTE ADULT - PROBLEM SELECTOR PLAN 3
controlled  BP with  orthostatic changes on standing as documented 9/1 by nurse   monitor closely on current BP meds   avoid hypotension
controlled better today   cont current BM meds

## 2021-09-03 NOTE — DISCHARGE NOTE PROVIDER - NSDCCPCAREPLAN_GEN_ALL_CORE_FT
PRINCIPAL DISCHARGE DIAGNOSIS  Diagnosis: Subdural hematoma  Assessment and Plan of Treatment: stable      SECONDARY DISCHARGE DIAGNOSES  Diagnosis: New onset atrial fibrillation  Assessment and Plan of Treatment: continue metoprolol  anticoagulation per cardiology and neurosurgery    Diagnosis: Hypertension  Assessment and Plan of Treatment: cont metoprolol  montitor your blood pressure if high speak to your doctor to start another medication

## 2021-09-03 NOTE — DISCHARGE NOTE PROVIDER - PROVIDER TOKENS
PROVIDER:[TOKEN:[[id number]]] PROVIDER:[TOKEN:[[id number]]],PROVIDER:[TOKEN:[[id number]],FOLLOWUP:[2 weeks]]

## 2021-09-03 NOTE — PROGRESS NOTE ADULT - PROBLEM SELECTOR PLAN 2
on metoprolol  cardiology follow up ,cont cardiac monitor   may need ILR to r/o SSS causing her syncope
on metoprolol  cardiology follow up appreciated   ILR today   pt is with orthostatic BP changes done 9/1   143 /75 on lying down , down to 113/54 on standing then developed rapid atrail fib   change metoprolol to long acting 50 mg daily , HR in 60's range   may need ILR to r/o SSS causing her syncope

## 2021-09-03 NOTE — DISCHARGE NOTE PROVIDER - NSDCMRMEDTOKEN_GEN_ALL_CORE_FT
amLODIPine 10 mg oral tablet: 1 tab(s) orally once a day  atorvastatin 40 mg oral tablet: 1 tab(s) orally once a day (at bedtime)  nitrofurantoin macrocrystals 50 mg oral capsule: 1 cap(s) orally once a day  omeprazole 40 mg oral delayed release capsule: 1 cap(s) orally once a day  raloxifene 60 mg oral tablet: 1 tab(s) orally once a day   atorvastatin 40 mg oral tablet: 1 tab(s) orally once a day (at bedtime)  melatonin 3 mg oral tablet: 1 tab(s) orally once a day (at bedtime)  metoprolol succinate 50 mg oral tablet, extended release: 1 tab(s) orally once a day  omeprazole 40 mg oral delayed release capsule: 1 cap(s) orally once a day  raloxifene 60 mg oral tablet: 1 tab(s) orally once a day  senna oral tablet: 1 tab(s) orally once a day  walker: DX   subdural hematoma

## 2021-09-03 NOTE — DISCHARGE NOTE PROVIDER - HOSPITAL COURSE
88 y.o. F with hx HTN and osteoporosis, recently discharged from [facility] on 8/12 after a mechanical fall 8/10/21, resulting in a left sided subdural hematoma. Patient was discharged from [facility] to home with outpatient follow up. Post discharge pt reported recurrent headaches controlled on tylenol, she followed up outpt neurology and had repeat ct head on 8/31 for non contrasted CTH study which showed an increase in the accumulation of fluid since her previous scan on 8/12. Pt was sent to [facility] due to the CT findings coupled with the consistent headache and an episode of near syncope.  A repeat scan was performed at [facility] and showed a stable subdural with no changes from previous outpatient study. She was then flown into [facility] via [facility] as a code trauma B which was cancelled upon arrival. Neurosurgery and NSICU were consulted and patient was admitted to the NSICU for observation overnight. HTN urgency noted on admission with SBP on arrival which was controlled with IV and po bp meds. Neurologically pt remained stable. Neuro ICU downgraded the pt to the medicine team to further manage the pre syncope. Pt noted to have rapid atrial fibrillation new onset , metoprolol started cardiology consulted   Pt is converted to NSR , BP improved controlled , she is stable s/p ILR today 9/3   stable discharge home with daughter     total time spend 35 min coordinating care

## 2021-09-03 NOTE — PHYSICAL THERAPY INITIAL EVALUATION ADULT - PERTINENT HX OF CURRENT PROBLEM, REHAB EVAL
88F with previous admission for SDH s/p mechanical fall, now presenting to [facility] with chronic L SDH and increased fluid component after an outpatient follow up CT and stability scan at [facility]. Patient complains of headache since previous admission, denies trauma since last admission and has no focal neurological deficits. Developed rapid afib 9/1, now in normal sinus rythm

## 2021-09-03 NOTE — PROGRESS NOTE ADULT - SUBJECTIVE AND OBJECTIVE BOX
Patient was [place]  [place] [facility] [place] in the setting of chronic subdural hematoma  evaluated by NICU stable tx to medicine service 9/1 , pty had an epsiode of new atrail fib , on metoprolol  cardiology called , pt is with syncope , r/o arthymia bardycardia     for ILR , BP controlled     pt is c/o HA , dizzy on standing     ALLERGIES:  codeine (Other)  penicillin (Hives)    Vital Signs Last 24 Hrs  T(C): 36.4 (03 Sep 2021 04:31), Max: 37.1 (02 Sep 2021 10:12)  T(F): 97.6 (03 Sep 2021 04:31), Max: 98.7 (02 Sep 2021 10:12)  HR: 64 (03 Sep 2021 04:31) (61 - 69)  BP: 139/72 (03 Sep 2021 04:31) (136/73 - 149/75)  BP(mean): --  RR: 18 (03 Sep 2021 04:31) (18 - 18)  SpO2: 95% (03 Sep 2021 04:31) (94% - 96%)        PHYSICAL EXAM:  General: awake alert , no distress   Neck: supple, no JVD   Lungs: CTA bilateral , no rales anteriorly   Cardio: RRR, S1/S2, No murmur  Abdomen: Soft, Nontender, Nondistended; Bowel sounds present  Extremities: No calf tenderness, No pitting edema                            11.9   5.24  )-----------( 167      ( 02 Sep 2021 07:52 )             37.4   09-02    143  |  107  |  7.7<L>  ----------------------------<  103<H>  3.8   |  23.0  |  0.82    Ca    8.8      02 Sep 2021 07:52  Phos  3.6     09-02  Mg     2.4     09-02

## 2021-09-03 NOTE — PHYSICAL THERAPY INITIAL EVALUATION ADULT - ADDITIONAL COMMENTS
Pt. has supportive family nearby, daughter from out of state will be coming to stay with pt. Pt. is jonah  house with 4 JUNIOR with one rail and no stairs inside. Pt. was independent PTA and owns a SAC.

## 2021-09-03 NOTE — DISCHARGE NOTE NURSING/CASE MANAGEMENT/SOCIAL WORK - PATIENT PORTAL LINK FT
You can access the FollowMyHealth Patient Portal offered by [facility] by registering at the following website: [email or web address]. By joining [employer]’s FollowMyHealth portal, you will also be able to view your health information using other applications (apps) compatible with our system.

## 2021-09-03 NOTE — DISCHARGE NOTE NURSING/CASE MANAGEMENT/SOCIAL WORK - NSDCPEFALRISK_GEN_ALL_CORE
For information on Fall & injury Prevention, visit [email or web address]

## 2021-09-27 ENCOUNTER — APPOINTMENT (OUTPATIENT)
Dept: NEUROSURGERY | Facility: CLINIC | Age: 86
End: 2021-09-27
Payer: MEDICARE

## 2021-09-27 VITALS
HEART RATE: 72 BPM | TEMPERATURE: 98 F | OXYGEN SATURATION: 98 % | DIASTOLIC BLOOD PRESSURE: 77 MMHG | BODY MASS INDEX: 26.76 KG/M2 | SYSTOLIC BLOOD PRESSURE: 144 MMHG | WEIGHT: 137 LBS

## 2021-09-27 PROCEDURE — 99213 OFFICE O/P EST LOW 20 MIN: CPT

## 2021-09-27 NOTE — REASON FOR VISIT
[Follow-Up: _____] : a [unfilled] follow-up visit [FreeTextEntry1] : At today's visit, she admits to mild left frontal headaches, no nausea or vomiting. Denies any vision changes. Denies any numbness/tingling or weakness of extremities. She is ambulating independently and working with physical therapy at home. No speech dysfluency.. She complaints of left thumb pain secondary to small fracture. She is able to move and use her hand.   Patient has complaints of lower extremity edema.  She is compliant with her amlodipine which is now 10 mg previously 2.5 mg daily.   No new falls or injuries. \par  \par

## 2021-09-27 NOTE — DATA REVIEWED
[de-identified] : \par EXAM: CT BRAIN\par \par PROCEDURE DATE: 08/31/2021\par \par \par \par \par INTERPRETATION: CLINICAL INFORMATION: Recent intracranial hemorrhage with headaches since.\par \par TECHNIQUE: Multiple axial sections were acquired from the base of the skull to the vertex without contrast enhancement. Coronal and sagittal reformats were additionally performed. Beam hardening artifact slightly obscures evaluation of the posterior fossa and brainstem.\par \par COMPARISON: CT of the head from earlier the same day and 8/12/2021.\par \par FINDINGS:\par A left frontoparietal mixed attenuation subdural hematoma measuring up to 8 mm in maximal thickness containing acute and subacute components resulting in 3 mm of left to right midline shift is not significantly changed compared with the examination from earlier the same day and increased in size compared to the examination from 8/12/2021. Acute subdural hemorrhage layering on the left tentorium is not significantly changed compared with the examination from earlier the same day and increased compared with the examination from 8/12/2021.\par \par Parenchymal volume loss is noted with prominent ventricles and sulci. Chronic microvascular ischemic changes are identified. No acute territorial infarct is demonstrated. Senescent bilateral basal ganglia calcifications are noted.\par \par Evaluation of the osseous structures with the appropriate window appears unremarkable. Vascular calcifications are noted. Sequela of bilateral lens surgery is seen. The visualized paranasal sinuses and mastoid air cells are clear.\par \par IMPRESSION:\par Left frontoparietal mixed attenuation subdural hematoma containing acute and subacute components resulting in 3 mm of left to right midline shift, not significantly changed compared with the examination from earlier the same day. Acute subdural hemorrhage layering on the left tentorium, not significantly changed compared with the examination from earlier the same day.

## 2021-09-27 NOTE — ASSESSMENT
[FreeTextEntry1] : Ms. Cormier presents with above history and imaging She is neurologically intact.   Given she has complaints of left sided headaches, she will obtain a repeat CT head w/o contrast to assess interval resolution of L SDH.\par Plan:\par CT head w/o contrast to assess ICH\par Continue BP medications as ordered to maintained BP <140/90. \par PMD  Dr. Mclean for LE edema. \par Maintain fall precautions.\par Follow up after imaging via telephone.\par Patient has been given an opportunity to ask and have their questions answered to their satisfaction.\par Patient knows to call the office if there are any new or worsening symptoms.\par \par \par I, Dr. Warren Mota, personally performed the evaluation and management (E/M) services for this patient.  That E/M includes assessment of the initial examination, assessing the pertinent medical and surgical history, and establishing the plan of care.  At the time of the visit, my ACP, Deidre Ga, actively participated in the evaluation and management services for this patient to be followed going forward in accordance with the plan documented in the clinical note.\par \par \par \par

## 2021-09-27 NOTE — REVIEW OF SYSTEMS
[As Noted in HPI] : as noted in HPI [Negative] : Heme/Lymph [Numbness] : no numbness [Tingling] : no tingling

## 2021-10-06 ENCOUNTER — APPOINTMENT (OUTPATIENT)
Dept: ULTRASOUND IMAGING | Facility: CLINIC | Age: 86
End: 2021-10-06
Payer: MEDICARE

## 2021-10-06 ENCOUNTER — APPOINTMENT (OUTPATIENT)
Dept: CT IMAGING | Facility: CLINIC | Age: 86
End: 2021-10-06
Payer: MEDICARE

## 2021-10-06 PROCEDURE — 70450 CT HEAD/BRAIN W/O DYE: CPT | Mod: MH

## 2021-10-06 PROCEDURE — 93970 EXTREMITY STUDY: CPT

## 2021-11-01 ENCOUNTER — APPOINTMENT (OUTPATIENT)
Dept: CT IMAGING | Facility: CLINIC | Age: 86
End: 2021-11-01
Payer: MEDICARE

## 2021-11-01 PROCEDURE — 70450 CT HEAD/BRAIN W/O DYE: CPT | Mod: MH

## 2021-11-19 ENCOUNTER — TRANSCRIPTION ENCOUNTER (OUTPATIENT)
Age: 86
End: 2021-11-19

## 2021-11-19 ENCOUNTER — APPOINTMENT (OUTPATIENT)
Dept: NEUROSURGERY | Facility: CLINIC | Age: 86
End: 2021-11-19
Payer: MEDICARE

## 2021-11-19 PROCEDURE — 99212 OFFICE O/P EST SF 10 MIN: CPT | Mod: 95

## 2021-11-19 NOTE — DATA REVIEWED
[de-identified] : EXAM: CT BRAIN\par \par \par PROCEDURE DATE: 11/01/2021\par \par \par \par INTERPRETATION: CLINICAL INFORMATION: surveillance of L SDH. Interval worsening on previous CT head.; Additional information per EMR: "Mechanical fall 8/10/21 resulting in a left sided subdural hematoma. Patient is not reporting any concerning neurological symptoms."\par \par TECHNIQUE:\par Axial CT images were acquired through the head. Intravenous contrast: None. Two-dimensional reformats were generated.\par \par COMPARISON STUDY: CT head 10/6/2021.\par \par FINDINGS:\par \par \par Compared to 10/6/2021, the hypoattenuating holohemispheric subdural collection along the left cerebral convexity remains grossly stable in and measures up to 1.3 cm in maximal thickness. Associated mass effect on the adjacent left cerebral parenchyma, with resulting 4 mm rightward midline shift remains stable.\par \par The collection appears increased in hypoattenuation, similar to 10/6/2021 and suggesting its chronicity. Linear hyperattenuating component along the medial and cranial aspects appears stable compared to 10/6/2021, suggesting stable small focus of likely subacute hemorrhage (up to 3 mm in thickness, image 18, series 2).\par \par At least 2 small hyperdense components within the hypoattenuating left subdural collection (measuring up to 2.4 x 0.4 cm and 0.7 x 0.6 cm, transverse x AP) appears slightly more organized hyperdense compared to 10/6/2021, suggesting slight interval loculation or evolution of previously seen acute to subacute hemorrhage (image 13, series 2). However, no definite new/enlarging focus of acute intracranial hemorrhage is identified.\par \par The ventricles are stable in size and configuration. There is no hydrocephalus.\par \par There is no evidence of acute confluent territorial infarction.\par \par Scattered subcortical/periventricular white matter hypodensities, nonspecific but most likely a sequela of chronic small vessel ischemia.\par Bilateral basal ganglia calcifications are incidentally noted.\par \par The basal cisterns are patent.\par Generalized parenchymal volume loss noted.\par \par A partially empty sella is present. Normal pineal gland are noted.\par No significant cerebellar tonsillar ectopia/herniation.\par \par The visualized orbits, paranasal sinuses and mastoid air cells are grossly clear. A metallic density within the right mastoid air cells is partially imaged.\par \par No acute calvarial fracture is identified.\par \par IMPRESSION:\par \par Compared to 10/6/2021, grossly stable hypoattenuating left holohemispheric subdural collection (1.3 cm maximal thickness), with slight interval evolution of small hyperdensity component, as detailed above. Associated mass effect and 4 mm rightward midline shift remains stable. No definite new/enlarging focus of acute intracranial hemorrhage identified.\par

## 2021-11-19 NOTE — REASON FOR VISIT
[Home] : at home, [unfilled] , at the time of the visit. [Medical Office: ([facility])___] : at the medical office located in  [Follow-Up: _____] : a [unfilled] follow-up visit [FreeTextEntry1] : Ms. Nevarez presents for follow up visit via telehealth for review of CT heading for surveillance of left SDH.  She denies any headaches, n/v or vision changes.  He has not any seizures.  Denies any numbness/tingling or weakness of extremities. She is compliant with her hypertension medications.  She offers no new complaints.

## 2021-11-19 NOTE — DISCUSSION/SUMMARY
[FreeTextEntry1] : Spoke with Modesta (daughter) of Ms. Nevarez regarding the CT head results.\par IMPRESSION:\par \par 1. Compared to 8/31/2021, interval enlargement of left holohemispheric subdural collection (up to 1.5 cm in maximal thickness, previously up to 1.2 cm). This collection demonstrates predominantly increased hypoattenuation, consistent with interval evolution of hemorrhage which is now chronic. However, few new hyperdense components suggest small foci of interval acute to subacute hemorrhage superimposed on chronic subdural hematoma. Close clinical follow-up with surveillance CT to evaluate stability are recommended.\par \par 2. Worsened mass effect results in slightly increased rightward midline shift (4 mm, previously 2 mm) and increased partial effacement of the left lateral ventricle.\par Patient is not reporting any concerning neurological symptoms. Will continue to monitor her symptoms.  Patient and daughter advised to present to nearest ER if there are any new or worsening symptoms.\par Repeat CT head w/o contrast in 2 weeks.\par Patient has been given an opportunity to ask and have their questions answered to their satisfaction.\par

## 2021-11-19 NOTE — ASSESSMENT
[FreeTextEntry1] : She is doing well from neurosurgical perspective.  Patient will repeat another CT head in one month to assess interval resolution of symptoms.  Follow up after imaging.\par Patient has been given an opportunity to ask and have their questions answered to their satisfaction.\par Patient knows to call the office if there are any new or worsening symptoms.\par

## 2021-12-17 ENCOUNTER — APPOINTMENT (OUTPATIENT)
Dept: CT IMAGING | Facility: CLINIC | Age: 86
End: 2021-12-17
Payer: MEDICARE

## 2021-12-17 PROCEDURE — G1004: CPT

## 2021-12-17 PROCEDURE — 70450 CT HEAD/BRAIN W/O DYE: CPT | Mod: MG

## 2022-01-07 NOTE — ED PROVIDER NOTE - PHYSICAL EXAMINATION
Const: Awake, alert and oriented. In no acute distress. Well appearing.  HEENT:  Moist mucous membranes.  Eyes: No scleral icterus. EOMI.  Neck:. Soft and supple. Full ROM without pain.  Cardiac: Regular rate and regular rhythm. +S1/S2. No murmurs. Peripheral pulses 2+ and symmetric. No LE edema.  Resp: Speaking in full sentences. No evidence of respiratory distress. No wheezes, rales or rhonchi.  Abd: Soft, non-tender, non-distended. Normal bowel sounds in all 4 quadrants. No guarding or rebound.  Back: Spine midline and non-tender. No CVAT.  Skin: Well healed abrasion to the left forehead. Bilateral abrasions to the knees and abrasion to the left lower leg.  Neuro: Awake, alert & oriented x 3. Moves all extremities symmetrically. never

## 2022-04-04 NOTE — ED ADULT TRIAGE NOTE - NS ED TRIAGE AVPU SCALE
Received refill request for omeprazole (PriLOSEC) 40 MG capsule        Last office visit: 08/23/2021        Upcoming visit None       Last filled 08/23/2021                According to the [facility] refill policy, Nancy M Behrens's Prescription refill request approved for 90 day supply    Alert-The patient is alert, awake and responds to voice. The patient is oriented to time, place, and person. The triage nurse is able to obtain subjective information.

## 2022-10-06 ENCOUNTER — APPOINTMENT (OUTPATIENT)
Dept: CT IMAGING | Facility: CLINIC | Age: 87
End: 2022-10-06

## 2022-10-06 PROCEDURE — 70450 CT HEAD/BRAIN W/O DYE: CPT | Mod: MH

## 2022-10-10 ENCOUNTER — NON-APPOINTMENT (OUTPATIENT)
Age: 87
End: 2022-10-10

## 2022-10-17 ENCOUNTER — APPOINTMENT (OUTPATIENT)
Dept: NEUROSURGERY | Facility: CLINIC | Age: 87
End: 2022-10-17

## 2022-10-17 VITALS
OXYGEN SATURATION: 100 % | HEART RATE: 68 BPM | DIASTOLIC BLOOD PRESSURE: 84 MMHG | HEIGHT: 60 IN | BODY MASS INDEX: 27.09 KG/M2 | TEMPERATURE: 98 F | WEIGHT: 138 LBS | SYSTOLIC BLOOD PRESSURE: 146 MMHG

## 2022-10-17 DIAGNOSIS — S06.5X9A TRAUMATIC SUBDURAL HEMORRHAGE WITH LOSS OF CONSCIOUSNESS OF UNSPECIFIED DURATION, INITIAL ENCOUNTER: ICD-10-CM

## 2022-10-17 PROCEDURE — 99213 OFFICE O/P EST LOW 20 MIN: CPT

## 2022-10-17 NOTE — PHYSICAL EXAM
[FreeTextEntry1] : Awake, alert, and oriented x 3. VSS. In no apparent distress. Short and long term memory intact. Speech is clear and appropriate. Affect is normal. Voice is strong. Respirations easy and even. Normal skin color and pigmentation. The sclera and conjunctiva normal. Ears, nose, and neck normal in appearance. EOMI, no nystagmus, facial sensation not intact, V1, V2, V3 distribution on left diminished, face symmetrical, hearing intact bilaterally, tongue and palate midline, head turning and shoulder shrug symmetric and no tongue deviation with protrusion. No pronator drift. No past-pointing, no tremors noted, no dysdiadochokinesia, and finger to nose dysmetria was not present. Romberg negative. \par Right hand dominant.\par \par Rises from a seated position in a comfortable fashion.\par \par Gait is well coordinated and stable without the use of an assistive device. Able to perform tandem walk without loss of balance. Motor strength in the upper extremities 5/5 in the biceps, triceps, and hand . Motor strength in the lower extremities is 5/5 in the iliopsoas, quadriceps, and hamstrings. \par \par \par

## 2022-10-17 NOTE — DATA REVIEWED
[de-identified] : EXAM: CT BRAIN\par \par \par PROCEDURE DATE: 11/01/2021\par \par \par \par INTERPRETATION: CLINICAL INFORMATION: surveillance of L SDH. Interval worsening on previous CT head.; Additional information per EMR: "Mechanical fall 8/10/21 resulting in a left sided subdural hematoma. Patient is not reporting any concerning neurological symptoms."\par \par TECHNIQUE:\par Axial CT images were acquired through the head. Intravenous contrast: None. Two-dimensional reformats were generated.\par \par COMPARISON STUDY: CT head 10/6/2021.\par \par FINDINGS:\par \par \par Compared to 10/6/2021, the hypoattenuating holohemispheric subdural collection along the left cerebral convexity remains grossly stable in and measures up to 1.3 cm in maximal thickness. Associated mass effect on the adjacent left cerebral parenchyma, with resulting 4 mm rightward midline shift remains stable.\par \par The collection appears increased in hypoattenuation, similar to 10/6/2021 and suggesting its chronicity. Linear hyperattenuating component along the medial and cranial aspects appears stable compared to 10/6/2021, suggesting stable small focus of likely subacute hemorrhage (up to 3 mm in thickness, image 18, series 2).\par \par At least 2 small hyperdense components within the hypoattenuating left subdural collection (measuring up to 2.4 x 0.4 cm and 0.7 x 0.6 cm, transverse x AP) appears slightly more organized hyperdense compared to 10/6/2021, suggesting slight interval loculation or evolution of previously seen acute to subacute hemorrhage (image 13, series 2). However, no definite new/enlarging focus of acute intracranial hemorrhage is identified.\par \par The ventricles are stable in size and configuration. There is no hydrocephalus.\par \par There is no evidence of acute confluent territorial infarction.\par \par Scattered subcortical/periventricular white matter hypodensities, nonspecific but most likely a sequela of chronic small vessel ischemia.\par Bilateral basal ganglia calcifications are incidentally noted.\par \par The basal cisterns are patent.\par Generalized parenchymal volume loss noted.\par \par A partially empty sella is present. Normal pineal gland are noted.\par No significant cerebellar tonsillar ectopia/herniation.\par \par The visualized orbits, paranasal sinuses and mastoid air cells are grossly clear. A metallic density within the right mastoid air cells is partially imaged.\par \par No acute calvarial fracture is identified.\par \par IMPRESSION:\par \par Compared to 10/6/2021, grossly stable hypoattenuating left holohemispheric subdural collection (1.3 cm maximal thickness), with slight interval evolution of small hyperdensity component, as detailed above. Associated mass effect and 4 mm rightward midline shift remains stable. No definite new/enlarging focus of acute intracranial hemorrhage identified.\par

## 2022-10-17 NOTE — ASSESSMENT
[FreeTextEntry1] : She is doing well from neurosurgical perspective. Patient is neurologically intact.  I have personally reviewed a CT head that does not reveal any evidence of acute findings.  No SDH. Patient does not wish to consider any medications for dizziness at this time.   \par Headache and dizziness diary \par Continue BP medications as ordered to maintained BP <140/90. \par Continue statin therapy with an LDL goal of < 70 for secondary stroke prevention.\par Patient has been given an opportunity to ask and have their questions answered to their satisfaction.\par Patient knows to call the office if there are any new or worsening symptoms.\par

## 2022-10-17 NOTE — REASON FOR VISIT
[Follow-Up: _____] : a [unfilled] follow-up visit [FreeTextEntry1] : Ms. Nevarez presents for follow up visit for review of CT heading for surveillance of left SDH.  She denies any headaches, n/v or vision changes.  Patient endorses intermittent dizziness that comes and goes.     He has not any seizures.  Denies any numbness/tingling or weakness of extremities. She is compliant with her hypertension medications.  She offers no new complaints.  No new falls or injuries. \par Patient is managing B/P with Losartan 50 mg , metoprolol 50 mg and amlodopine 5 mg daily.  [Home] : at home, [unfilled] , at the time of the visit. [Medical Office: ([facility])___] : at the medical office located in

## 2023-03-14 NOTE — ED PROVIDER NOTE - CADM POA PRESS ULCER
This patient has documentation of a history of atrial fibrillation. 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 · Cardiovascular S1 S2 present; no murmur; Irregularly irregular rhythm    3/12 Hospitalist: HEART: Regular rate and rhythm; No murmurs, rubs, or gallops... Hx of HTN, Afib... Afib: - Pradaxa held for now given ercp/eus    * Patient Currently Takes Medications as of 11-Mar-2023 05:04 documented in Structured Notes  · 	acetaminophen 325 mg oral tablet: 2 tab(s) orally every 6 hours, As needed, Mild Pain (1 - 3)  · 	Pradaxa 150 mg oral capsule: 1 cap(s) orally 2 times a day  · 	losartan 100 mg oral tablet: 1 tab(s) orally once a day  · 	hydroCHLOROthiazide 12.5 mg oral tablet: 1 tab(s) orally once a day  · 	Norvasc 2.5 mg oral tablet: 1 tab(s) orally once a day  · 	Bystolic 10 mg oral tablet: 1 tab(s) orally once a day  · 	enalapril: No

## 2023-05-03 ENCOUNTER — OFFICE (OUTPATIENT)
Dept: URBAN - METROPOLITAN AREA CLINIC 105 | Facility: CLINIC | Age: 88
Setting detail: OPHTHALMOLOGY
End: 2023-05-03
Payer: MEDICARE

## 2023-05-03 DIAGNOSIS — H01.004: ICD-10-CM

## 2023-05-03 DIAGNOSIS — H00.11: ICD-10-CM

## 2023-05-03 DIAGNOSIS — H01.001: ICD-10-CM

## 2023-05-03 PROCEDURE — 99213 OFFICE O/P EST LOW 20 MIN: CPT | Performed by: STUDENT IN AN ORGANIZED HEALTH CARE EDUCATION/TRAINING PROGRAM

## 2023-05-03 ASSESSMENT — KERATOMETRY
OS_K2POWER_DIOPTERS: 43.50
OD_K2POWER_DIOPTERS: 43.50
OS_K1POWER_DIOPTERS: 43.00
OD_K1POWER_DIOPTERS: 42.75
OS_AXISANGLE_DEGREES: 166
OD_AXISANGLE_DEGREES: 035

## 2023-05-03 ASSESSMENT — SUPERFICIAL PUNCTATE KERATITIS (SPK)
OD_SPK: ABSENT
OS_SPK: ABSENT

## 2023-05-03 ASSESSMENT — REFRACTION_MANIFEST
OD_AXIS: 113
OD_CYLINDER: -0.75
OS_VA1: 20/20-
OS_CYLINDER: -1.00
OS_ADD: +2.50
OD_SPHERE: +1.75
OD_CYLINDER: -1.00
OS_SPHERE: +0.50
OD_ADD: +2.50
OD_VA1: 20/40
OD_AXIS: 095
OS_CYLINDER: -0.75
OD_SPHERE: +0.25
OS_AXIS: 080
OS_SPHERE: +0.50
OS_AXIS: 085
OD_VA1: 20/20
OS_VA1: 20/25++

## 2023-05-03 ASSESSMENT — REFRACTION_CURRENTRX
OD_VPRISM_DIRECTION: SV
OD_OVR_VA: 20/
OS_OVR_VA: 20/
OS_SPHERE: +2.50
OS_VPRISM_DIRECTION: SV
OD_SPHERE: +2.50

## 2023-05-03 ASSESSMENT — REFRACTION_AUTOREFRACTION
OD_CYLINDER: -1.50
OS_CYLINDER: -1.00
OD_AXIS: 105
OD_SPHERE: +0.25
OS_AXIS: 080
OS_SPHERE: +0.50

## 2023-05-03 ASSESSMENT — SPHEQUIV_DERIVED
OD_SPHEQUIV: -0.5
OD_SPHEQUIV: -0.125
OS_SPHEQUIV: 0.125
OS_SPHEQUIV: 0
OD_SPHEQUIV: 1.25
OS_SPHEQUIV: 0

## 2023-05-03 ASSESSMENT — AXIALLENGTH_DERIVED
OS_AL: 23.6841
OS_AL: 23.6351
OD_AL: 23.9292
OS_AL: 23.6841
OD_AL: 23.7798
OD_AL: 23.2477

## 2023-05-03 ASSESSMENT — LID EXAM ASSESSMENTS
OD_BLEPHARITIS: RUL 3+ 4+
OS_BLEPHARITIS: LUL 3+ 4+

## 2023-05-03 ASSESSMENT — VISUAL ACUITY
OD_BCVA: 20/50+2
OS_BCVA: 20/50+

## 2023-05-03 ASSESSMENT — CONFRONTATIONAL VISUAL FIELD TEST (CVF)
OS_FINDINGS: FULL
OD_FINDINGS: FULL

## 2023-07-25 ENCOUNTER — APPOINTMENT (OUTPATIENT)
Dept: ULTRASOUND IMAGING | Facility: CLINIC | Age: 88
End: 2023-07-25
Payer: MEDICARE

## 2023-07-25 PROCEDURE — 93971 EXTREMITY STUDY: CPT | Mod: LT

## 2023-07-26 ENCOUNTER — APPOINTMENT (OUTPATIENT)
Dept: RADIOLOGY | Facility: CLINIC | Age: 88
End: 2023-07-26

## 2023-07-27 ENCOUNTER — APPOINTMENT (OUTPATIENT)
Dept: RADIOLOGY | Facility: CLINIC | Age: 88
End: 2023-07-27
Payer: MEDICARE

## 2023-07-27 PROCEDURE — 77080 DXA BONE DENSITY AXIAL: CPT

## 2023-07-31 NOTE — ED PROVIDER NOTE - NSICDXFAMILYHX_GEN_ALL_CORE_FT
FAMILY HISTORY:  No pertinent family history in first degree relatives     Gabapentin Counseling: I discussed with the patient the risks of gabapentin including but not limited to dizziness, somnolence, fatigue and ataxia.

## 2023-10-24 NOTE — REASON FOR VISIT
No. [New Patient Visit] : a new patient visit [Family Member] : family member [FreeTextEntry1] : left subdural hematoma

## 2024-01-03 ENCOUNTER — APPOINTMENT (OUTPATIENT)
Dept: NEUROSURGERY | Facility: CLINIC | Age: 89
End: 2024-01-03

## 2024-06-26 ENCOUNTER — APPOINTMENT (OUTPATIENT)
Dept: CT IMAGING | Facility: CLINIC | Age: 89
End: 2024-06-26
Payer: MEDICARE

## 2024-06-26 PROCEDURE — 71250 CT THORAX DX C-: CPT | Mod: MH

## 2024-11-22 ENCOUNTER — APPOINTMENT (OUTPATIENT)
Dept: ULTRASOUND IMAGING | Facility: CLINIC | Age: 89
End: 2024-11-22
Payer: MEDICARE

## 2024-11-22 PROCEDURE — 93971 EXTREMITY STUDY: CPT | Mod: LT

## 2025-09-17 ENCOUNTER — NON-APPOINTMENT (OUTPATIENT)
Age: OVER 89
End: 2025-09-17